# Patient Record
Sex: FEMALE | Race: WHITE | NOT HISPANIC OR LATINO | URBAN - METROPOLITAN AREA
[De-identification: names, ages, dates, MRNs, and addresses within clinical notes are randomized per-mention and may not be internally consistent; named-entity substitution may affect disease eponyms.]

---

## 2023-09-28 ENCOUNTER — TRANSFERRED RECORDS (OUTPATIENT)
Dept: HEALTH INFORMATION MANAGEMENT | Facility: CLINIC | Age: 33
End: 2023-09-28

## 2023-10-16 ENCOUNTER — TELEPHONE (OUTPATIENT)
Dept: SURGERY | Facility: CLINIC | Age: 33
End: 2023-10-16

## 2023-10-16 NOTE — TELEPHONE ENCOUNTER
Patient had colon cancer this spring and is s/p sigmoidectomy with colostomy placement with Dr. Rachell Bartholomew. Per patient she did have residual cancer but elected to not do chemotherapy. She follows with Dr. Bowers at [facility]. Last scan was Sept 2023. Because she decided to not do chemotherapy Dr Bartholomew will not take down her colostomy. She would like a second opinion on this. Scheduled visit with Dr. Hernandez.     Insurance Carrier: [employer]

## 2023-10-16 NOTE — TELEPHONE ENCOUNTER
Diagnosis, Referred by & from: Discuss Colostomy Reversal   Appt date: 2023   NOTES STATUS DETAILS   OFFICE NOTE from referring provider N/A    OFFICE NOTE from other specialist Care Everywhere [facility]:  23, 23 - ONC OV with Dr. Bowers    [facility]:  23 - WOUND OV with Shanti Valiente RN  23 - UC OV with Sangeeta Silver NP   DISCHARGE SUMMARY from hospital Care Everywhere [facility]:  23 - Admission with Dr. Cedillo  23 - Admission with Dr. Roth  23 - Admission with Dr. Bejarano   DISCHARGE REPORT from the ER N/A    OPERATIVE REPORT Care Everywhere [facility]:  23 - OP Note for LAPAROSCOPIC SIGMOID RESECTION WITH CREATION OF COLOSTOMY, APPENDECTOMY, CYSTOSCOPY WITH BILATERAL URETERAL STENT with Dr. Cedillo   MEDICATION LIST Care Everywhere    LABS     ANAL PAP/CEA Care Everywhere [facility]:  23 - CEA (1.9)    [facility]:  23 - CEA (19.5)   BIOPSIES/PATHOLOGY RELATED TO DIAGNOSIS Reqd 23 per RN  - Received -  [facility]:  23 - Colon Biopsy (Case: [id number])  23 - Sigmoid Colon Biopsy (Case: [id number])   DIAGNOSTIC PROCEDURES     FLEX SIGMOIDOSCOPY Care Everywhere [facility]:  23 - Flexible Sigmoidoscopy   IMAGING (DISC & REPORT)      CT Received [facility]:  10/26/23 - CT Chest/Abd/Pelvis    [facility]:  23 - CT Chest  23 - CT Abd/Pelvis  23 - CT Abd/Pelvis   XRAY Received [facility]:  23 - XR Abdomen  23 - XR Abdomen     Records Requested  10/16/23    Facility  [facility]  Fax: [phone]  Path Fax: [phone]   Outcome * 10/16/23 2:58 PM Faxed urg req to [facility] for images to be pushed to [facility] PACs. - Polly    * 10/16/23 3:22 PM Faxed req to [facility] for Pathology slides to be Fed'Exd to the clinic. - Polly    * 10/18/23 1:18 PM Pathology received from [facility] and sent to be reviewed with filled out pathology form.  Images received from [facility] and attached to the patient in PACs. - Polly    Trackin

## 2023-10-16 NOTE — TELEPHONE ENCOUNTER
[facility] Call Center    Phone Message    May a detailed message be left on voicemail: yes     Reason for Call: Other: Pt stated they are having stoma treatment issues with their current surgeon and is requesting a call back from the clinic to discuss scheduling an appt.       Action Taken: Message routed to:  [facility] DOMINGO MORIN    Travel Screening: Not Applicable

## 2023-10-17 ENCOUNTER — TRANSCRIBE ORDERS (OUTPATIENT)
Dept: OTHER | Age: 33
End: 2023-10-17

## 2023-10-17 DIAGNOSIS — C18.9 COLON CANCER (H): Primary | ICD-10-CM

## 2023-10-18 DIAGNOSIS — C18.7 CANCER OF SIGMOID COLON (H): Primary | ICD-10-CM

## 2023-10-19 ENCOUNTER — LAB REQUISITION (OUTPATIENT)
Dept: LAB | Facility: CLINIC | Age: 33
End: 2023-10-19
Payer: COMMERCIAL

## 2023-10-19 PROCEDURE — 88321 CONSLTJ&REPRT SLD PREP ELSWR: CPT | Mod: GC | Performed by: PATHOLOGY

## 2023-10-20 ENCOUNTER — MEDICAL CORRESPONDENCE (OUTPATIENT)
Dept: HEALTH INFORMATION MANAGEMENT | Facility: CLINIC | Age: 33
End: 2023-10-20
Payer: COMMERCIAL

## 2023-10-23 LAB
PATH REPORT.COMMENTS IMP SPEC: ABNORMAL
PATH REPORT.COMMENTS IMP SPEC: YES
PATH REPORT.FINAL DX SPEC: ABNORMAL
PATH REPORT.MICROSCOPIC SPEC OTHER STN: ABNORMAL
PATH REPORT.RELEVANT HX SPEC: ABNORMAL
PATH REPORT.RELEVANT HX SPEC: ABNORMAL
PATH REPORT.SITE OF ORIGIN SPEC: ABNORMAL

## 2023-10-26 ENCOUNTER — TRANSFERRED RECORDS (OUTPATIENT)
Dept: HEALTH INFORMATION MANAGEMENT | Facility: CLINIC | Age: 33
End: 2023-10-26
Payer: COMMERCIAL

## 2023-11-01 NOTE — PROGRESS NOTES
Colon and Rectal Surgery Clinic Note    RE: Rachell Stacy.  : 1990.  MYNOR: 2023.    Reason for visit: colostomy reversal, second opinion.    HPI: Rachell Stacy is a 33 year old female who presents today for colostomy reversal. On 2023 she was admitted to the hospital for abdominal pain and was found to have a perforated sigmoid cancer involving the appendix. She underwent a laparoscopic sigmoid colectomy with end colostomy and appendectomy with Dr. Bina Cedillo. Pathology demonstrated moderately differentiated sigmoid adenocarcinoma, MMR intact, [id number] (3/28 lymph nodes positive). On pathology consult there was positive LVI, PNI, TB and one of the positive lymph nodes was from the mesentery. She follows with Dr. Bryan Bowers at [facility] who recommended adjuvant chemotherapy however pt politely declined at that time as she wanted to seek holistic measures. CT Chest/Abdomen/Pelvis on 10/26/2023 demonstrated a soft tissue nodular density in the left paracolic gutter concerning for metastatic lesion, a subcentimeter low-attenuation lesion in right hepatic lobe which is new from previous scan, and a 2mm nodule in the left lung, unchanged. CEA on 2023 was 1.9.     Today Rachell feels fine. She has been losing healthy weight.     Pathology consult from surgery on 2023:  SIGMOID COLON AND APPENDIX, SIGMOIDECTOMY AND EN BLOC APPENDECTOMY:  Adenocarcinoma, sigmoid colon:   -Moderately differentiated; size= 10.4 x 9.1 x 8.0 cm   -Tumor invades through the visceral peritoneum with gross perforation    -Lymphovascular and perineural invasion are identified   -Positive for metastasis to three of twenty-eight local lymph nodes (3 /28)   -Margins: Negative for involvement by carcinoma:     (closest margin: mesenteric margin, 1.0 cm clear)     (one of positive lymph nodes present at the mesenteric margin)     (distance from proximal & distal margins: 11 cm & 16 cm respectively)   -Positive for tumor  deposit x1 focus   -Negative for high score tumor budding (score = 0-4)   -AJCC/TNM stage: pT4a N1   -MMR intact by immunohistochemistry by report in a pre-op biopsy:     ([facility] endoscopic biopsy ref. #[id number] dated 4/27/2023)  Appendix: Benign appendix with fibrous serosal adhesions    CEA (9/25/2023):    CT Chest/Abdomen/Pelvis (10/26/2023):    Medical history:  No past medical history on file.    Surgical history:  No past surgical history on file.    Family history:  No family history on file.      Medications:  No current outpatient medications on file.       Allergies:  Not on File    Social history:   Social History     Tobacco Use    Smoking status: Not on file    Smokeless tobacco: Not on file   Substance Use Topics    Alcohol use: Not on file     Marital status: single.    ROS:  A complete review of systems was performed with the patient and all systems negative except as per HPI.    Physical Examination:  Exam was chaperoned by ANSHU Billy-P  There were no vitals taken for this visit.  General: Well hydrated. No acute distress.  CV: RRR  Lung: Non-labored breathing on RA  Abdomen: Soft, NT, ostomy in place, mild hepatomegaly. No inguinal adenopathy palpated.      ASSESSMENT    This is a 33 year old F with a h/o mod diff [id number] adenocarcinoma LVI/PNI+ s/p Christine's procedure by Dr Cedillo who then declined adjuvant chemo, now with concerns for metastatic disease to both peritoneum and liver. Further work up is indicated with at least a liver MRI. She would then need to meet with medical oncology to discuss next steps. Risks, benefits, and alternatives of operative treatment were thoroughly discussed with the patient, she understands these well and agrees to proceed.    All pertinent labs and imaging were personally reviewed by me.      PLAN  - Liver MRI  - DMT discussion  - Referral to med onc  - RTC after completing adjuvant chemotherapy with re-staging to discuss next steps      45 minutes  spent on the date of the encounter doing chart review, history and exam, imaging review, documentation and further activities as noted above.      Kendrick Hernandez MD    Division of Colon and Rectal Surgery  [facility]    Referring Provider:  Referred Self, MD  No address on file     Primary Care Provider:  Shanna Hassan

## 2023-11-07 ENCOUNTER — PRE VISIT (OUTPATIENT)
Dept: SURGERY | Facility: CLINIC | Age: 33
End: 2023-11-07

## 2023-11-07 ENCOUNTER — OFFICE VISIT (OUTPATIENT)
Dept: SURGERY | Facility: CLINIC | Age: 33
End: 2023-11-07
Payer: COMMERCIAL

## 2023-11-07 VITALS — OXYGEN SATURATION: 98 % | SYSTOLIC BLOOD PRESSURE: 127 MMHG | DIASTOLIC BLOOD PRESSURE: 78 MMHG | HEART RATE: 94 BPM

## 2023-11-07 DIAGNOSIS — Z93.3 STATUS POST COLOSTOMY (H): Primary | ICD-10-CM

## 2023-11-07 DIAGNOSIS — C18.7 MALIGNANT NEOPLASM OF SIGMOID COLON (H): ICD-10-CM

## 2023-11-07 PROCEDURE — 99204 OFFICE O/P NEW MOD 45 MIN: CPT | Performed by: SURGERY

## 2023-11-07 ASSESSMENT — PAIN SCALES - GENERAL: PAINLEVEL: NO PAIN (0)

## 2023-11-07 NOTE — NURSING NOTE
Chief Complaint   Patient presents with    Consult       Vitals:    11/07/23 1444   BP: 127/78   BP Location: Left arm   Patient Position: Sitting   Cuff Size: Adult Large   Pulse: 94   SpO2: 98%       There is no height or weight on file to calculate BMI.    Fabricio Capone EMT-P

## 2023-11-07 NOTE — LETTER
2023       RE: Rachell Stacy  [address] [facility] [address]     Dear Colleague,    Thank you for referring your patient, Rachell Stacy, to the [facility] [place] [facility]. Please see a copy of my visit note below.    Colon and Rectal Surgery Clinic Note    RE: Rachell Stacy.  : 1990.  MYNOR: 2023.    Reason for visit: colostomy reversal, second opinion.    HPI: Rachell Stacy is a 33 year old female who presents today for colostomy reversal. On 2023 she was admitted to the hospital for abdominal pain and was found to have a perforated sigmoid cancer involving the appendix. She underwent a laparoscopic sigmoid colectomy with end colostomy and appendectomy with Dr. Bina Cedillo. Pathology demonstrated moderately differentiated sigmoid adenocarcinoma, MMR intact, [id number] (3/28 lymph nodes positive). On pathology consult there was positive LVI, PNI, TB and one of the positive lymph nodes was from the mesentery. She follows with Dr. Bryan Bowers at [facility] who recommended adjuvant chemotherapy however pt politely declined at that time as she wanted to seek holistic measures. CT Chest/Abdomen/Pelvis on 10/26/2023 demonstrated a soft tissue nodular density in the left paracolic gutter concerning for metastatic lesion, a subcentimeter low-attenuation lesion in right hepatic lobe which is new from previous scan, and a 2mm nodule in the left lung, unchanged. CEA on 2023 was 1.9.     Today Rachell feels fine. She has been losing healthy weight.     Pathology consult from surgery on 2023:  SIGMOID COLON AND APPENDIX, SIGMOIDECTOMY AND EN BLOC APPENDECTOMY:  Adenocarcinoma, sigmoid colon:   -Moderately differentiated; size= 10.4 x 9.1 x 8.0 cm   -Tumor invades through the visceral peritoneum with gross perforation    -Lymphovascular and perineural invasion are identified   -Positive for metastasis  to three of twenty-eight local lymph nodes (3 /28)   -Margins: Negative for involvement by carcinoma:     (closest margin: mesenteric margin, 1.0 cm clear)     (one of positive lymph nodes present at the mesenteric margin)     (distance from proximal & distal margins: 11 cm & 16 cm respectively)   -Positive for tumor deposit x1 focus   -Negative for high score tumor budding (score = 0-4)   -AJCC/TNM stage: pT4a N1   -MMR intact by immunohistochemistry by report in a pre-op biopsy:     ([facility] endoscopic biopsy ref. #[id number] dated 4/27/2023)  Appendix: Benign appendix with fibrous serosal adhesions    CEA (9/25/2023):    CT Chest/Abdomen/Pelvis (10/26/2023):    Medical history:  No past medical history on file.    Surgical history:  No past surgical history on file.    Family history:  No family history on file.      Medications:  No current outpatient medications on file.       Allergies:  Not on File    Social history:   Social History     Tobacco Use    Smoking status: Not on file    Smokeless tobacco: Not on file   Substance Use Topics    Alcohol use: Not on file     Marital status: single.    ROS:  A complete review of systems was performed with the patient and all systems negative except as per HPI.    Physical Examination:  Exam was chaperoned by ANSHU Billy-P  There were no vitals taken for this visit.  General: Well hydrated. No acute distress.  CV: RRR  Lung: Non-labored breathing on RA  Abdomen: Soft, NT, ostomy in place, mild hepatomegaly. No inguinal adenopathy palpated.      ASSESSMENT    This is a 33 year old F with a h/o mod diff [id number] adenocarcinoma LVI/PNI+ s/p Christine's procedure by Dr Cedillo who then declined adjuvant chemo, now with concerns for metastatic disease to both peritoneum and liver. Further work up is indicated with at least a liver MRI. She would then need to meet with medical oncology to discuss next steps. Risks, benefits, and alternatives of operative treatment were  thoroughly discussed with the patient, she understands these well and agrees to proceed.    All pertinent labs and imaging were personally reviewed by me.      PLAN  - Liver MRI  - DMT discussion  - Referral to med onc  - RTC after completing adjuvant chemotherapy with re-staging to discuss next steps    45 minutes spent on the date of the encounter doing chart review, history and exam, imaging review, documentation and further activities as noted above.    Referring Provider:  Referred Self, MD  No address on file     Primary Care Provider:  Shanna Hassan      Again, thank you for allowing me to participate in the care of your patient.      Sincerely,    Kendrick Hernandez MD

## 2024-01-12 ENCOUNTER — TRANSFERRED RECORDS (OUTPATIENT)
Dept: HEALTH INFORMATION MANAGEMENT | Facility: CLINIC | Age: 34
End: 2024-01-12

## 2024-01-13 ENCOUNTER — HEALTH MAINTENANCE LETTER (OUTPATIENT)
Age: 34
End: 2024-01-13

## 2024-02-14 ENCOUNTER — TRANSFERRED RECORDS (OUTPATIENT)
Dept: HEALTH INFORMATION MANAGEMENT | Facility: CLINIC | Age: 34
End: 2024-02-14

## 2024-03-12 ENCOUNTER — TRANSFERRED RECORDS (OUTPATIENT)
Dept: HEALTH INFORMATION MANAGEMENT | Facility: CLINIC | Age: 34
End: 2024-03-12
Payer: COMMERCIAL

## 2024-03-13 ENCOUNTER — TRANSFERRED RECORDS (OUTPATIENT)
Dept: HEALTH INFORMATION MANAGEMENT | Facility: CLINIC | Age: 34
End: 2024-03-13
Payer: COMMERCIAL

## 2024-03-15 ENCOUNTER — TELEPHONE (OUTPATIENT)
Dept: SURGERY | Facility: CLINIC | Age: 34
End: 2024-03-15
Payer: COMMERCIAL

## 2024-03-15 NOTE — TELEPHONE ENCOUNTER
[facility] Call Center    Phone Message    May a detailed message be left on voicemail: yes     Reason for Call: Other: Pretty calling for Dr Hernandez to call Dr Bowers back to discuss patient. Please reach out to Dr Bowers at cell number [phone].     Action Taken: Message routed to:  [facility] Colon and Rectal    Travel Screening: Not Applicable

## 2024-03-19 NOTE — PROGRESS NOTES
Colon and Rectal Surgery Clinic Note    RE: Rachell Stacy.  : 1990.  MYNOR: 3/26/2024.    Reason for visit: discuss surgery.    HPI: Rachell Stacy is a 33 year old female who presents today to discuss surgery. On 2023 she was admitted to the hospital for abdominal pain and was found to have a perforated sigmoid cancer involving the appendix. She underwent a laparoscopic sigmoid colectomy with end colostomy and appendectomy with Dr. Bina Cedillo. Pathology demonstrated moderately differentiated sigmoid adenocarcinoma, MMR intact, [id number] (3/28 lymph nodes positive). On pathology consult there was positive LVI, PNI, TB and one of the positive lymph nodes was from the mesentery. She follows with Dr. Bryan Bowers at [facility] who recommended adjuvant chemotherapy however pt politely declined at that time as she wanted to seek holistic measures. CT Chest/Abdomen/Pelvis on 10/26/2023 demonstrated a soft tissue nodular density in the left paracolic gutter concerning for metastatic lesion, a subcentimeter low-attenuation lesion in right hepatic lobe which is new from previous scan, and a 2mm nodule in the left lung, unchanged. CEA on 2023 was 1.9.     She underwent 8 cycles of FOLFOX from 2023-2024. PET on 3/12/2024 demonstrated a decreased size of the left paracolic gutter mass. There was no other evidence of metastatic disease.  The liver lesion seen on previous scan was not visible on PET. There was an unchanged 2mm left lung nodule. CEA on 2024 was 2.1.      Interval History: Doing well, tolerated chemo, eager to have her ostomy taken down.     Pathology consult from surgery on 2023:  SIGMOID COLON AND APPENDIX, SIGMOIDECTOMY AND EN BLOC APPENDECTOMY:  Adenocarcinoma, sigmoid colon:   -Moderately differentiated; size= 10.4 x 9.1 x 8.0 cm   -Tumor invades through the visceral peritoneum with gross perforation    -Lymphovascular and perineural invasion are identified   -Positive  for metastasis to three of twenty-eight local lymph nodes (3 /28)   -Margins: Negative for involvement by carcinoma:     (closest margin: mesenteric margin, 1.0 cm clear)     (one of positive lymph nodes present at the mesenteric margin)     (distance from proximal & distal margins: 11 cm & 16 cm respectively)   -Positive for tumor deposit x1 focus   -Negative for high score tumor budding (score = 0-4)   -AJCC/TNM stage: pT4a N1   -MMR intact by immunohistochemistry by report in a pre-op biopsy:     ([facility] endoscopic biopsy ref. #[id number] dated 4/27/2023)  Appendix: Benign appendix with fibrous serosal adhesions     CEA (9/25/2023):    CT Chest/Abdomen/Pelvis (10/26/2023):      CT Chest/Abdomen/Pelvis (1/12/2024):      PET (3/12/2024):        Medications:  Current Outpatient Medications   Medication Sig Dispense Refill    magnesium citrate 1.745 GM/30ML solution Drink 1 bottle at 8pm the night before surgery 296 mL 0    metroNIDAZOLE (FLAGYL) 500 MG tablet Take 1 tablet (500 mg) by mouth every 6 hours At 8:00 am, 2:00 pm, 8:00 pm the day prior to your surgery with neomycin and zofran. 3 tablet 0    neomycin (MYCIFRADIN) 500 MG tablet Take 2 tablets (1,000 mg) by mouth every 6 hours At 8:00 am, 2:00 pm, 8:00 pm the day prior to your surgery with flagyl and zofran. 6 tablet 0    ondansetron (ZOFRAN) 4 MG tablet Take 1 tablet (4 mg) by mouth every 6 hours At 8:00 am, 2:00 pm, 8:00 pm the day prior to your surgery with neomycin and flagyl. 3 tablet 0    polyethylene glycol (MIRALAX) 17 GM/Dose powder Please take 238 grams mixed with 64 oz of Gatorade at 4pm the night before surgery 238 g 0       Allergies:  No Known Allergies    Social history:   Social History     Tobacco Use    Smoking status: Never    Smokeless tobacco: Never   Substance Use Topics    Alcohol use: Not on file     Marital status: single.    ROS:  A complete review of systems was performed with the patient and all systems negative except as per  "HPI.    Physical Examination:  Exam was chaperoned by Fabricio Capone, EMT-P  /73 (BP Location: Left arm, Patient Position: Sitting, Cuff Size: Adult Large)   Pulse 98   SpO2 97%   General: Well hydrated. No acute distress.  Abdomen: Soft, NT, well healed midline incision. Ostomy in place.  Perianal external examination:  Perianal skin: intact.  Lesions: No.  Eversion of buttocks: There was not evidence of an anal fissure. Details: N/A.    Digital rectal examination: Was performed.   Sphincter tone: Good.  Palpable lesions: No.  Other: None.  Bimanual examination: was not performed.      Procedures:  Flexible sigmoidoscopy: after obtaining informed consent and performing a \"time out\", an adult flexible sigmoidoscope was introduced through the anus and passed up to the mid sigmoid colon. The quality of the prep was excellent. Findings: There was no active ulceration, inflammation or bleeding. No additional abnormalities were seen. Staple line at 15 cm. Total scope time: 10 minutes. The patient tolerated the procedure well.      ASSESSMENT    This is a 33 year old F with a moderately differentiated sigmoid adenocarcinoma, MMR intact, [id number] s/p Christine's who subsequently developed peritoneal metastases and is now s/p adjuvant treatment (which was initially declined). Her disease appears resectable and therefore a cytoreduction with colostomy takedown is indicated. The plan was also discussed over the phone with dr Bowers. All questions were answered. Risks, benefits, and alternatives of operative treatment were thoroughly discussed with the patient, she understands these well and agrees to proceed.    All pertinent labs and imaging were personally reviewed by me.    PLAN  - Urology for cysto/stents  - Mech/abx bowel prep  - To OR for CRS and ostomy takedown  - Preop eval and teaching  - WOCN to eyad for possible ileostomy      30 minutes spent on the date of the encounter doing chart review, history and exam, " imaging review, documentation and further activities as noted above.      Kendrick Hernandez MD    Division of Colon and Rectal Surgery  [facility]    Referring Provider:  Provider Not In System        Primary Care Provider:  Shanna Hassan

## 2024-03-26 ENCOUNTER — PREP FOR PROCEDURE (OUTPATIENT)
Dept: UROLOGY | Facility: CLINIC | Age: 34
End: 2024-03-26

## 2024-03-26 ENCOUNTER — PREP FOR PROCEDURE (OUTPATIENT)
Dept: SURGERY | Facility: CLINIC | Age: 34
End: 2024-03-26

## 2024-03-26 ENCOUNTER — TELEPHONE (OUTPATIENT)
Dept: SURGERY | Facility: CLINIC | Age: 34
End: 2024-03-26

## 2024-03-26 ENCOUNTER — OFFICE VISIT (OUTPATIENT)
Dept: SURGERY | Facility: CLINIC | Age: 34
End: 2024-03-26
Payer: COMMERCIAL

## 2024-03-26 VITALS — SYSTOLIC BLOOD PRESSURE: 123 MMHG | DIASTOLIC BLOOD PRESSURE: 73 MMHG | OXYGEN SATURATION: 97 % | HEART RATE: 98 BPM

## 2024-03-26 DIAGNOSIS — C18.7 MALIGNANT NEOPLASM OF SIGMOID COLON (H): ICD-10-CM

## 2024-03-26 DIAGNOSIS — Z93.3 STATUS POST COLOSTOMY (H): ICD-10-CM

## 2024-03-26 DIAGNOSIS — C78.6 PERITONEAL CARCINOMATOSIS (H): Primary | ICD-10-CM

## 2024-03-26 PROCEDURE — 99214 OFFICE O/P EST MOD 30 MIN: CPT | Performed by: SURGERY

## 2024-03-26 RX ORDER — NEOMYCIN SULFATE 500 MG/1
1000 TABLET ORAL EVERY 6 HOURS
Qty: 6 TABLET | Refills: 0 | Status: ON HOLD | OUTPATIENT
Start: 2024-03-26 | End: 2024-04-04

## 2024-03-26 RX ORDER — POLYETHYLENE GLYCOL 3350 17 G/17G
POWDER, FOR SOLUTION ORAL
Qty: 238 G | Refills: 0 | Status: ON HOLD | OUTPATIENT
Start: 2024-03-26 | End: 2024-04-04

## 2024-03-26 RX ORDER — METRONIDAZOLE 500 MG/1
500 TABLET ORAL EVERY 6 HOURS
Qty: 3 TABLET | Refills: 0 | Status: ON HOLD | OUTPATIENT
Start: 2024-03-26 | End: 2024-04-04

## 2024-03-26 RX ORDER — MAGNESIUM CARB/ALUMINUM HYDROX 105-160MG
TABLET,CHEWABLE ORAL
Qty: 296 ML | Refills: 0 | Status: ON HOLD | OUTPATIENT
Start: 2024-03-26 | End: 2024-04-04

## 2024-03-26 RX ORDER — ONDANSETRON 4 MG/1
4 TABLET, FILM COATED ORAL EVERY 6 HOURS
Qty: 3 TABLET | Refills: 0 | Status: ON HOLD | OUTPATIENT
Start: 2024-03-26 | End: 2024-04-04

## 2024-03-26 ASSESSMENT — PAIN SCALES - GENERAL: PAINLEVEL: NO PAIN (0)

## 2024-03-26 NOTE — CONFIDENTIAL NOTE
Rachell Regis had flexible endoscope (serial number [id number], model Olympus [id number]) used for a procedure on 3/26/2024 at 1:32 PM.      Fabricio Capone, EMT-P

## 2024-03-26 NOTE — LETTER
3/26/2024       RE: Rachell Stacy  [address] [facility] [address]       Dear Colleague,    Thank you for referring your patient, Rachell Stacy, to the [facility] COLON AND RECTAL SURGERY CLINIC [place] at [facility]. Please see a copy of my visit note below.    Colon and Rectal Surgery Clinic Note    RE: Rachell Stacy.  : 1990.  MYNOR: 3/26/2024.    Reason for visit: discuss surgery.    HPI: Rachell Stacy is a 33 year old female who presents today to discuss surgery. On 2023 she was admitted to the hospital for abdominal pain and was found to have a perforated sigmoid cancer involving the appendix. She underwent a laparoscopic sigmoid colectomy with end colostomy and appendectomy with Dr. Bina Cedillo. Pathology demonstrated moderately differentiated sigmoid adenocarcinoma, MMR intact, [id number] (3/28 lymph nodes positive). On pathology consult there was positive LVI, PNI, TB and one of the positive lymph nodes was from the mesentery. She follows with Dr. Bryan Bowers at [facility] who recommended adjuvant chemotherapy however pt politely declined at that time as she wanted to seek holistic measures. CT Chest/Abdomen/Pelvis on 10/26/2023 demonstrated a soft tissue nodular density in the left paracolic gutter concerning for metastatic lesion, a subcentimeter low-attenuation lesion in right hepatic lobe which is new from previous scan, and a 2mm nodule in the left lung, unchanged. CEA on 2023 was 1.9.     She underwent 8 cycles of FOLFOX from 2023-2024. PET on 3/12/2024 demonstrated a decreased size of the left paracolic gutter mass. There was no other evidence of metastatic disease.  The liver lesion seen on previous scan was not visible on PET. There was an unchanged 2mm left lung nodule. CEA on 2024 was 2.1.      Interval History: Doing well, tolerated chemo, eager to have her ostomy taken down.     Pathology consult from  surgery on 5/4/2023:  SIGMOID COLON AND APPENDIX, SIGMOIDECTOMY AND EN BLOC APPENDECTOMY:  Adenocarcinoma, sigmoid colon:   -Moderately differentiated; size= 10.4 x 9.1 x 8.0 cm   -Tumor invades through the visceral peritoneum with gross perforation    -Lymphovascular and perineural invasion are identified   -Positive for metastasis to three of twenty-eight local lymph nodes (3 /28)   -Margins: Negative for involvement by carcinoma:     (closest margin: mesenteric margin, 1.0 cm clear)     (one of positive lymph nodes present at the mesenteric margin)     (distance from proximal & distal margins: 11 cm & 16 cm respectively)   -Positive for tumor deposit x1 focus   -Negative for high score tumor budding (score = 0-4)   -AJCC/TNM stage: pT4a N1   -MMR intact by immunohistochemistry by report in a pre-op biopsy:     ([facility] endoscopic biopsy ref. #[id number] dated 4/27/2023)  Appendix: Benign appendix with fibrous serosal adhesions     CEA (9/25/2023):    CT Chest/Abdomen/Pelvis (10/26/2023):      CT Chest/Abdomen/Pelvis (1/12/2024):      PET (3/12/2024):        Medications:  Current Outpatient Medications   Medication Sig Dispense Refill    magnesium citrate 1.745 GM/30ML solution Drink 1 bottle at 8pm the night before surgery 296 mL 0    metroNIDAZOLE (FLAGYL) 500 MG tablet Take 1 tablet (500 mg) by mouth every 6 hours At 8:00 am, 2:00 pm, 8:00 pm the day prior to your surgery with neomycin and zofran. 3 tablet 0    neomycin (MYCIFRADIN) 500 MG tablet Take 2 tablets (1,000 mg) by mouth every 6 hours At 8:00 am, 2:00 pm, 8:00 pm the day prior to your surgery with flagyl and zofran. 6 tablet 0    ondansetron (ZOFRAN) 4 MG tablet Take 1 tablet (4 mg) by mouth every 6 hours At 8:00 am, 2:00 pm, 8:00 pm the day prior to your surgery with neomycin and flagyl. 3 tablet 0    polyethylene glycol (MIRALAX) 17 GM/Dose powder Please take 238 grams mixed with 64 oz of Gatorade at 4pm the night before surgery 238 g 0  "      Allergies:  No Known Allergies    Social history:   Social History     Tobacco Use    Smoking status: Never    Smokeless tobacco: Never   Substance Use Topics    Alcohol use: Not on file     Marital status: single.    ROS:  A complete review of systems was performed with the patient and all systems negative except as per HPI.    Physical Examination:  Exam was chaperoned by Fabricio Capone, EMT-P  /73 (BP Location: Left arm, Patient Position: Sitting, Cuff Size: Adult Large)   Pulse 98   SpO2 97%   General: Well hydrated. No acute distress.  Abdomen: Soft, NT, well healed midline incision. Ostomy in place.  Perianal external examination:  Perianal skin: intact.  Lesions: No.  Eversion of buttocks: There was not evidence of an anal fissure. Details: N/A.    Digital rectal examination: Was performed.   Sphincter tone: Good.  Palpable lesions: No.  Other: None.  Bimanual examination: was not performed.      Procedures:  Flexible sigmoidoscopy: after obtaining informed consent and performing a \"time out\", an adult flexible sigmoidoscope was introduced through the anus and passed up to the mid sigmoid colon. The quality of the prep was excellent. Findings: There was no active ulceration, inflammation or bleeding. No additional abnormalities were seen. Staple line at 15 cm. Total scope time: 10 minutes. The patient tolerated the procedure well.      ASSESSMENT    This is a 33 year old F with a moderately differentiated sigmoid adenocarcinoma, MMR intact, [id number] s/p Christine's who subsequently developed peritoneal metastases and is now s/p adjuvant treatment (which was initially declined). Her disease appears resectable and therefore a cytoreduction with colostomy takedown is indicated. The plan was also discussed over the phone with dr Bowers. All questions were answered. Risks, benefits, and alternatives of operative treatment were thoroughly discussed with the patient, she understands these well and agrees " to proceed.    All pertinent labs and imaging were personally reviewed by me.    PLAN  - Urology for cysto/stents  - Mech/abx bowel prep  - To OR for CRS and ostomy takedown  - Preop eval and teaching  - WOCN to eyad for possible ileostomy      30 minutes spent on the date of the encounter doing chart review, history and exam, imaging review, documentation and further activities as noted above.        Referring Provider:  Provider Not In System        Primary Care Provider:  Shanna Hassan, thank you for allowing me to participate in the care of your patient.      Sincerely,    Kendrick Hernandez MD

## 2024-03-26 NOTE — PATIENT INSTRUCTIONS
Follow up:    We will want to know about your decision ASAP as we would need to get you in for your pre ops this week. Otherwise we are looking into May  You can call Rachell, our surgery scheduler, directly to start the scheduling process.    Appointment you will need in prep: pre op physical with our anesthesia team, blood work, and ostomy nurse visit   You will need to do a full bowel prep with antibiotics the day before surgery. I have sent these to your pharmacy. You will receive a surgery packet through [employer] and mail.     MARILYN Post [phone]    Clinic Fax Number [phone]    Surgery Scheduling (Rachell) [phone]    My Chart is available 24 hours a day and is a secure way to access your records and communicate with your care team.  I strongly recommend signing up if you haven't already done so, if you are comfortable with computers.  If you would like to inquire about this or are having problems with My Chart access, you may call [phone] or go online at [email or web address].  Please allow at least 24 hours for a response and extra time on weekends and Holidays.

## 2024-03-26 NOTE — TELEPHONE ENCOUNTER
FUTURE VISIT INFORMATION      SURGERY INFORMATION:  Date: 4/1/24  [facility] or  Surgeon:  Kendrick Hernandez MD   Anesthesia Type:  general  Procedure: Exploratory LAPAROSCOPY, cytoreduction, colostomy takedown, possible ileostomy placement CLOSURE, COLOSTOMY, LAPAROSCOPIC CREATION, ILEOSTOMY, LAPAROSCOPIC   Consult: ov 3/26/24    RECORDS REQUESTED FROM:       Primary Care Provider: Joya

## 2024-03-26 NOTE — TELEPHONE ENCOUNTER
Patient is scheduled for surgery with Dr. Kendrick Hernandez     Spoke with: Rachell    Date of Surgery: Monday April 1, 2024    Location: [facility] OR    Informed patient they will need an adult  (surgery admit)    Pre op with Provider Dr. Kendrick Hernandez     H&P Scheduled with PAC Video Visit on 3/28/2024    Labs scheduled at [facility] on 3/28/2024    Ostomy Marking to be completed by Inpatient [facility]    2x Post-op appts with CRS scheduled    Surgery packet: will send via [employer] and Mail     Rachell Hough  Meli-op Coordinator  [facility]  Direct Phone: [phone]

## 2024-03-26 NOTE — NURSING NOTE
Chief Complaint   Patient presents with    Follow Up       Vitals:    03/26/24 1147   BP: 123/73   BP Location: Left arm   Patient Position: Sitting   Cuff Size: Adult Large   Pulse: 98   SpO2: 97%       There is no height or weight on file to calculate BMI.    Fabricio Capone EMT-P

## 2024-03-27 LAB
ABO/RH(D): NORMAL
ANTIBODY SCREEN: NEGATIVE
SPECIMEN EXPIRATION DATE: NORMAL

## 2024-03-28 ENCOUNTER — ANESTHESIA EVENT (OUTPATIENT)
Dept: SURGERY | Facility: CLINIC | Age: 34
End: 2024-03-28
Payer: COMMERCIAL

## 2024-03-28 ENCOUNTER — PRE VISIT (OUTPATIENT)
Dept: SURGERY | Facility: CLINIC | Age: 34
End: 2024-03-28

## 2024-03-28 ENCOUNTER — VIRTUAL VISIT (OUTPATIENT)
Dept: SURGERY | Facility: CLINIC | Age: 34
End: 2024-03-28
Payer: COMMERCIAL

## 2024-03-28 ENCOUNTER — LAB (OUTPATIENT)
Dept: LAB | Facility: CLINIC | Age: 34
End: 2024-03-28
Payer: COMMERCIAL

## 2024-03-28 VITALS — HEIGHT: 69 IN | WEIGHT: 259 LBS | BODY MASS INDEX: 38.36 KG/M2

## 2024-03-28 DIAGNOSIS — Z01.818 PREOP EXAMINATION: ICD-10-CM

## 2024-03-28 DIAGNOSIS — C18.7 MALIGNANT NEOPLASM OF SIGMOID COLON (H): ICD-10-CM

## 2024-03-28 DIAGNOSIS — Z01.818 PREOP EXAMINATION: Primary | ICD-10-CM

## 2024-03-28 DIAGNOSIS — C78.6 PERITONEAL CARCINOMATOSIS (H): ICD-10-CM

## 2024-03-28 LAB
BASOPHILS # BLD AUTO: 0 10E3/UL (ref 0–0.2)
BASOPHILS NFR BLD AUTO: 0 %
EOSINOPHIL # BLD AUTO: 0.1 10E3/UL (ref 0–0.7)
EOSINOPHIL NFR BLD AUTO: 1 %
ERYTHROCYTE [DISTWIDTH] IN BLOOD BY AUTOMATED COUNT: 16.7 % (ref 10–15)
HCT VFR BLD AUTO: 34.3 % (ref 35–47)
HGB BLD-MCNC: 10.9 G/DL (ref 11.7–15.7)
IMM GRANULOCYTES # BLD: 0.1 10E3/UL
IMM GRANULOCYTES NFR BLD: 1 %
LYMPHOCYTES # BLD AUTO: 2.9 10E3/UL (ref 0.8–5.3)
LYMPHOCYTES NFR BLD AUTO: 34 %
MCH RBC QN AUTO: 30.8 PG (ref 26.5–33)
MCHC RBC AUTO-ENTMCNC: 31.8 G/DL (ref 31.5–36.5)
MCV RBC AUTO: 97 FL (ref 78–100)
MONOCYTES # BLD AUTO: 0.5 10E3/UL (ref 0–1.3)
MONOCYTES NFR BLD AUTO: 6 %
NEUTROPHILS # BLD AUTO: 4.8 10E3/UL (ref 1.6–8.3)
NEUTROPHILS NFR BLD AUTO: 58 %
PLATELET # BLD AUTO: 258 10E3/UL (ref 150–450)
RBC # BLD AUTO: 3.54 10E6/UL (ref 3.8–5.2)
WBC # BLD AUTO: 8.4 10E3/UL (ref 4–11)

## 2024-03-28 PROCEDURE — 99203 OFFICE O/P NEW LOW 30 MIN: CPT | Mod: 95 | Performed by: CLINICAL NURSE SPECIALIST

## 2024-03-28 PROCEDURE — 84134 ASSAY OF PREALBUMIN: CPT

## 2024-03-28 PROCEDURE — 80053 COMPREHEN METABOLIC PANEL: CPT

## 2024-03-28 PROCEDURE — 86901 BLOOD TYPING SEROLOGIC RH(D): CPT

## 2024-03-28 PROCEDURE — 86900 BLOOD TYPING SEROLOGIC ABO: CPT

## 2024-03-28 PROCEDURE — 86850 RBC ANTIBODY SCREEN: CPT

## 2024-03-28 PROCEDURE — 85025 COMPLETE CBC W/AUTO DIFF WBC: CPT

## 2024-03-28 PROCEDURE — 36415 COLL VENOUS BLD VENIPUNCTURE: CPT

## 2024-03-28 ASSESSMENT — ENCOUNTER SYMPTOMS
SEIZURES: 0
DYSRHYTHMIAS: 0

## 2024-03-28 ASSESSMENT — LIFESTYLE VARIABLES: TOBACCO_USE: 0

## 2024-03-28 ASSESSMENT — PAIN SCALES - GENERAL: PAINLEVEL: NO PAIN (0)

## 2024-03-28 NOTE — H&P
Pre-Operative H & P     CC:  Preoperative exam to assess for increased cardiopulmonary risk while undergoing surgery and anesthesia.    Date of Encounter: 3/28/2024  Primary Care Physician:  Shanna Hassan     Reason for visit:   Encounter Diagnoses   Name Primary?    Preop examination Yes    Malignant neoplasm of sigmoid colon (H)        HPI  Rachell Stacy is a 33 year old female who presents for pre-operative H & P in preparation for  Procedure Information       Case: [id number] Date/Time: 04/01/24 1000    Procedures:       Cystoscopy, with Ureteral Stent Placement (Bilateral: Urethra)      Exploratory laparotomy, cytoreduction (Abdomen)      colostomy takedown possible ileostomy placement (Abdomen)    Anesthesia type: General with Block    Diagnosis:       Peritoneal carcinomatosis (H) [C78.6]      Malignant neoplasm of sigmoid colon (H) [C18.7]    Pre-op diagnosis:       Peritoneal carcinomatosis (H) [C78.6]      Malignant neoplasm of sigmoid colon (H) [C18.7]    [facility]: [facility]  [facility]    Providers: Rachele Dewitt MD; Kenrdick Hernandez MD          History is obtained from the patient, mother, and chart review    Patient who was admitted for abdominal pain on 5/4/24, and was found to have a perforated sigmoid cancer involving the appendix. She is s/p laparoscopic sigmoid colectomy with end colostomy and appendectomy with Dr. Bina Cedillo. Per notes pathology demonstrated moderately differentiated sigmoid adenocarcinoma, MMR intact, [id number] (3/28 lymph nodes positive). On pathology consult there was positive LVI, PNI, TB and one of the positive lymph nodes was from the mesentery. She follows with Dr. Bryan Bowers at [facility], who recommended adjuvant chemotherapy however patient declined at that time as she wanted to seek holistic measures. A follow up CT Chest/Abdomen/Pelvis on 10/26/2023 demonstrated a soft tissue nodular density in the left paracolic gutter concerning for metastatic lesion, a  subcentimeter low-attenuation lesion in right hepatic lobe which is new from previous scan, and a 2mm nodule in the left lung, unchanged. Her CEA on 9/25/2023 was 1.9.      She then underwent 8 cycles of FOLFOX from 11/20/2023-2/14/2024. PET scan on 3/12/2024 demonstrated a decreased size of the left paracolic gutter mass. There was no other evidence of metastatic disease. The liver lesion seen on previous scan was not visible on PET. There was an unchanged 2mm left lung nodule. CEA on 1/31/2024 was 2.1.     She was referred to Dr. Hernandez in consideration for surgical management. Per notes her disease now appears resectable and she was counseled for above procedures.    Her family history is significant for DVT/PE in father who was found to have Factor 5 Leiden. Patient was tested and was negative.     Hx of abnormal bleeding or anti-platelet use: Denies    Menstrual history: No LMP recorded. (Menstrual status: Chemotherapy).     Past Medical History  Past Medical History:   Diagnosis Date    Colostomy status (H)     Malignant neoplasm of sigmoid colon (H)     Peritoneal carcinomatosis (H)        Past Surgical History  Past Surgical History:   Procedure Laterality Date    Cystoscopy, placement of bilateral ureteral stents  05/04/2023    FLEXIBLE SIGMOIDOSCOPY      Sigmoid resection, colostomy, appendectomy  05/04/2023    [place] teeth extraction          Prior to Admission Medications  Current Outpatient Medications   Medication Sig Dispense Refill    magnesium citrate 1.745 GM/30ML solution Drink 1 bottle at 8pm the night before surgery 296 mL 0    metroNIDAZOLE (FLAGYL) 500 MG tablet Take 1 tablet (500 mg) by mouth every 6 hours At 8:00 am, 2:00 pm, 8:00 pm the day prior to your surgery with neomycin and zofran. 3 tablet 0    neomycin (MYCIFRADIN) 500 MG tablet Take 2 tablets (1,000 mg) by mouth every 6 hours At 8:00 am, 2:00 pm, 8:00 pm the day prior to your surgery with flagyl and zofran. 6 tablet 0     ondansetron (ZOFRAN) 4 MG tablet Take 1 tablet (4 mg) by mouth every 6 hours At 8:00 am, 2:00 pm, 8:00 pm the day prior to your surgery with neomycin and flagyl. 3 tablet 0    polyethylene glycol (MIRALAX) 17 GM/Dose powder Please take 238 grams mixed with 64 oz of Gatorade at 4pm the night before surgery 238 g 0       Allergies  No Known Allergies    Social History  Social History     Socioeconomic History    Marital status: Single     Spouse name: Not on file    Number of children: Not on file    Years of education: Not on file    Highest education level: Not on file   Occupational History    Not on file   Tobacco Use    Smoking status: Never    Smokeless tobacco: Never   Substance and Sexual Activity    Alcohol use: Not Currently    Drug use: Never    Sexual activity: Not on file   Other Topics Concern    Not on file   Social History Narrative    Not on file     Social Determinants of Health     Financial Resource Strain: Not on file   Food Insecurity: Not on file   Transportation Needs: Not on file   Physical Activity: Not on file   Stress: Not on file   Social Connections: Not on file   Interpersonal Safety: Not on file   Housing Stability: Not on file       Family History  Family History   Problem Relation Age of Onset    Anemia Mother     Thyroid Disease Mother     Sleep Apnea Father     Factor V Leiden deficiency Father     Pulmonary Embolism Father     Deep Vein Thrombosis Father     Colon Cancer Maternal Grandmother     Thyroid Disease Maternal Grandmother     Hypertension Maternal Grandfather     Alcoholism Paternal Grandfather     Anesthesia Reaction No family hx of     Bleeding Disorder No family hx of        Review of Systems  The complete review of systems is negative other than noted in the HPI or here.   Anesthesia Evaluation   Pt has had prior anesthetic. Type: General and MAC (Wakes up crying).    No history of anesthetic complications       ROS/MED HX  ENT/Pulmonary:     (+)     MANDEEP risk factors,    obese,                             (-) tobacco use and recent URI   Neurologic:    (-) no seizures and no CVA   Cardiovascular: Comment: BP range normal    (+)  - -   -  - -                                 No previous cardiac testing  (-) taking anticoagulants/antiplatelets, FLORES and arrhythmias   METS/Exercise Tolerance: 4 - Raking leaves, gardening Comment: Still recovering from chemotherapy but has been able to walk distance, shop, visit friends   Hematologic: Comments: Father with Factor V Leiden and PE/DVT. Patient tested neg   (-) history of blood clots and history of blood transfusion   Musculoskeletal:  - neg musculoskeletal ROS     GI/Hepatic: Comment: Colostomy status    (+)        bowel prep,         (-) GERD   Renal/Genitourinary:  - neg Renal ROS     Endo:     (+)               Obesity,       Psychiatric/Substance Use:    (-) psychiatric history   Infectious Disease:  - neg infectious disease ROS     Malignancy:   (+) Malignancy, History of GI.GI CA  Active status post Chemo and Surgery.      Other:  - neg other ROS          Virtual visit -  No vitals were obtained    Physical Exam  Constitutional: Awake, alert, no apparent distress, and appears stated age. Mother accompanies.  HENT: Normocephalic  Respiratory: non labored breathing; no cough  Neurologic: Oriented to name, place and time.   Neuropsychiatric: Calm, cooperative. Normal affect.      Prior Labs/Diagnostic Studies   All labs and imaging personally reviewed     EKG: Not indicated    Imaging scanned in    The patient's records and results personally reviewed by this provider.     Outside records reviewed from: Care Everywhere    Assessment    Rachell Stacy is a 33 year old female seen as a PAC referral for risk assessment and optimization for anesthesia.    Plan/Recommendations  Pt will be optimized for the proposed procedure.  See below for details on the assessment, risk, and preoperative recommendations    NEUROLOGY  - No history of TIA,  "CVA or seizure    Post Op delirium risk factors:  No risk identified    ENT  - No current airway concerns.  Will need to be reassessed day of surgery.  Mallampati: Unable to assess  TM: Unable to assess  Anesthesia airway notes in Care Everywhere    CARDIAC  No cardiac history, symptoms or meds. BP range normal. Denies chest pain or DYSPNEA ON EXERTION. Some palpations during chemotherapy. Is still recovering from therapy, but has improving energy and endurance. Able to walk some distance, shop and visit friends.  - METS (Metabolic Equivalents)~4    RCRI: 0.9% risk of serious cardiac events    PULMONARY  Denies asthma, cough or shortness of breath  Low risk for MANDEEP  - Tobacco History    History   Smoking Status    Never   Smokeless Tobacco    Never       GI: Denies GERD  LLQ colostomy  PONV Medium Risk  Total Score: 2           1 AN PONV: Pt is Female    1 AN PONV: Patient is not a current smoker        /RENAL  - Baseline Creatinine  Last 0.70    ENDOCRINE    - BMI: Estimated body mass index is 38.25 kg/m  as calculated from the following:    Height as of this encounter: 1.753 m (5' 9\").    Weight as of this encounter: 117.5 kg (259 lb).  Obesity (BMI >30)  - No history of Diabetes Mellitus    HEME: 4.5%  Family history of father with PE/DVT and Factor 5 Leiden. Patient tested negative  No personal history of blood clots  Denies personal or family history of bleeding disorder  Denies history of blood transfusion   Type and screen and updated labs to be drawn later today    ACCESS: Right upper chest port    Different anesthesia methods/types have been discussed with the patient, but they are aware that the final plan will be decided by the assigned anesthesia provider on the date of service.  Patient was reviewed by Dr. Leach    The patient is optimized for their procedure. AVS with information on surgery time/arrival time, meds and NPO status given by nursing staff. No further diagnostic testing " indicated.    Please refer to the physical examination documented by the anesthesiologist in the anesthesia record on the day of surgery.    ADDENDUM: Updated labs 3/28/24 by surgery team  Prealbumin 25.8  Last Comprehensive Metabolic Panel:  Sodium   Date Value Ref Range Status   03/28/2024 139 135 - 145 mmol/L Final     Comment:     Reference intervals for this test were updated on 09/26/2023 to more accurately reflect our healthy population. There may be differences in the flagging of prior results with similar values performed with this method. Interpretation of those prior results can be made in the context of the updated reference intervals.      Potassium   Date Value Ref Range Status   03/28/2024 4.3 3.4 - 5.3 mmol/L Final     Chloride   Date Value Ref Range Status   03/28/2024 104 98 - 107 mmol/L Final     Carbon Dioxide (CO2)   Date Value Ref Range Status   03/28/2024 21 (L) 22 - 29 mmol/L Final     Anion Gap   Date Value Ref Range Status   03/28/2024 14 7 - 15 mmol/L Final     Glucose   Date Value Ref Range Status   03/28/2024 99 70 - 99 mg/dL Final     Urea Nitrogen   Date Value Ref Range Status   03/28/2024 14.8 6.0 - 20.0 mg/dL Final     Creatinine   Date Value Ref Range Status   03/28/2024 0.75 0.51 - 0.95 mg/dL Final     GFR Estimate   Date Value Ref Range Status   03/28/2024 >90 >60 mL/min/1.73m2 Final     Calcium   Date Value Ref Range Status   03/28/2024 9.8 8.6 - 10.0 mg/dL Final     Bilirubin Total   Date Value Ref Range Status   03/28/2024 0.2 <=1.2 mg/dL Final     Alkaline Phosphatase   Date Value Ref Range Status   03/28/2024 106 40 - 150 U/L Final     Comment:     Reference intervals for this test were updated on 11/14/2023 to more accurately reflect our healthy population. There may be differences in the flagging of prior results with similar values performed with this method. Interpretation of those prior results can be made in the context of the updated reference intervals.     ALT   Date  Value Ref Range Status   03/28/2024 21 0 - 50 U/L Final     Comment:     Reference intervals for this test were updated on 6/12/2023 to more accurately reflect our healthy population. There may be differences in the flagging of prior results with similar values performed with this method. Interpretation of those prior results can be made in the context of the updated reference intervals.       AST   Date Value Ref Range Status   03/28/2024 22 0 - 45 U/L Final     Comment:     Reference intervals for this test were updated on 6/12/2023 to more accurately reflect our healthy population. There may be differences in the flagging of prior results with similar values performed with this method. Interpretation of those prior results can be made in the context of the updated reference intervals.     Lab Results   Component Value Date    WBC 8.4 03/28/2024     Lab Results   Component Value Date    RBC 3.54 03/28/2024     Lab Results   Component Value Date    HGB 10.9 03/28/2024     Lab Results   Component Value Date    HCT 34.3 03/28/2024     Lab Results   Component Value Date    MCV 97 03/28/2024     Lab Results   Component Value Date    MCH 30.8 03/28/2024     Lab Results   Component Value Date    MCHC 31.8 03/28/2024     Lab Results   Component Value Date    RDW 16.7 03/28/2024     Lab Results   Component Value Date     03/28/2024     Type and screen     Video-Visit Details    Type of service:  Video Visit    Provider received verbal consent for a Video Visit from the patient? Yes   Video Start Time: 1:52pm   Video End Time: 2:04pm     Originating Location (pt. Location): Home    Distant Location (provider location):  Off-site  Mode of Communication:  Video Conference via [employer]  On the day of service:     Prep time: 12 minutes  Visit time: 12 minutes  Documentation time: 15 minutes  ------------------------------------------  Total time: 39 minutes      MARISOL Alvarenga CNS  Preoperative Assessment  [facility]  Clinic and Surgery Center  Phone: [phone]  Fax: [phone]

## 2024-03-28 NOTE — PATIENT INSTRUCTIONS
Preparing for Your Surgery      Name:  Rachell Stacy   MRN:  [id number]   :  1990   Today's Date:  3/28/2024       Arriving for surgery:  Surgery date:  24  Arrival time:  12:30 pm    Please come to:     [facility] [address]  [address] [place]  [address]      The [facility] Patient /Visitor Ramp is located at [address]. Patients and visitors who self-park will receive the reduced hospital parking rate. If the Patient /Visitor Ramp is full, please follow the signs to the [employer] parking located at the main hospital entrance.     parking is available ( 24 hours/ 7 days a week)    Discounted parking pass options are available for patients and visitors. They can be purchased at the [employer] desk at the main hospital entrance.    -  Stop at the security desk and they will direct surgery patients to Registration, and then the 3rd floor Surgery Waiting Room. [phone]     -  If you are in need of directions, wheelchair or escort please stop at the Information/security desk in the lobby.       What can I eat or drink?  -  Follow [facility] instructions for starting Bowel prep instructions and Clear Liquid diet.  -  You may have clear liquids until 2 hours prior to arrival time. (Until 10:30 am)    Examples of clear liquids:  Water  Clear broth  Juices (apple, white grape, white cranberry  and cider) without pulp  Noncarbonated, powder based beverages  (lemonade and Vishal-Aid)  Sodas (Sprite, 7-Up, ginger ale and seltzer)  Coffee or tea (without milk or cream)  Gatorade    -  No Alcohol or cannabis products for at least 24 hours before surgery.     Which medicines can I take?  Hold Aspirin for 7 days before surgery.   Hold Multivitamins for 7 days before surgery.  Hold Supplements for 7 days before surgery.  Hold Ibuprofen (Advil, Motrin) for 1 day before surgery--unless otherwise directed by  surgeon.  Hold Naproxen (Aleve) for 4 days before surgery.  Acetaminophen (Tylenol) is okay to take if needed.    -  PLEASE TAKE these medications the day of surgery:  Acetaminophen (Tylenol) if needed    How do I prepare myself?  - Please take 2 showers (one the night prior to surgery and one the morning of surgery) using Scrubcare or Hibiclens soap.    Use this soap only from the neck to your toes.     Leave the soap on your skin for one minute--then rinse thoroughly.      You may use your own shampoo and conditioner. No other hair products.   - Please remove all jewelry and body piercings.  - No lotions, deodorants or fragrance.  - No makeup or fingernail polish.   - Bring your ID and insurance card.    -If you use a CPAP machine, please bring the CPAP machine, tubing, and mask to hospital.    -If you have a Deep Brain Stimulator, Spinal Cord Stimulator, or any Neuro Stimulator device---you must bring the remote control to the hospital.      ALL PATIENTS GOING HOME THE SAME DAY OF SURGERY ARE REQUIRED TO HAVE A RESPONSIBLE ADULT TO DRIVE AND BE IN ATTENDANCE WITH THEM FOR 24 HOURS FOLLOWING SURGERY.    Covid testing policy as of 12/06/2022  Your surgeon will notify and schedule you for a COVID test if one is needed before surgery--please direct any questions or COVID symptoms to your surgeon      Questions or Concerns:    - For any questions regarding the day of surgery or your hospital stay, please contact the Pre Admission Nursing Office at [phone].       - If you have health changes between today and your surgery, please call your surgeon.       - For questions after surgery, please call your surgeons office.           Current Visitor Guidelines    You may have 2 visitors in the pre op area.    Visiting hours: 8 a.m. to 8:30 p.m.    Patients confirmed or suspected to have symptoms of COVID 19 or flu:     No visitors allowed for adult patients.   Children (under age 18) can have 1 named visitor.     People  who are sick or showing symptoms of COVID 19 or flu:    Are not allowed to visit patients--we can only make exceptions in special situations.       Please follow these guidelines for your visit:          Please maintain social distance          Masking is optional--however at times you may be asked to wear a mask for the safety of yourself and others     Clean your hands with alcohol hand . Do this when you arrive at and leave the building and patient room,    And again after you touch your mask or anything in the room.     Go directly to and from the room you are visiting.     Stay in the patient s room during your visit. Limit going to other places in the hospital as much as possible     Leave bags and jackets at home or in the car.     For everyone s health, please don t come and go during your visit. That includes for smoking   during your visit.

## 2024-03-28 NOTE — PROGRESS NOTES
Rachell is a 33 year old who is being evaluated via a billable video visit.    How would you like to obtain your AVS? MyChart  If the video visit is dropped, the invitation should be resent by: Text to cell phone: [phone]        Cedrick Lovett is a 33 year old, presenting for the following health issues:  Pre-Op Exam        HPI         Physical Exam

## 2024-03-29 ENCOUNTER — TEAM CONFERENCE (OUTPATIENT)
Dept: SURGERY | Facility: CLINIC | Age: 34
End: 2024-03-29
Payer: COMMERCIAL

## 2024-03-29 ENCOUNTER — TELEPHONE (OUTPATIENT)
Dept: SURGERY | Facility: CLINIC | Age: 34
End: 2024-03-29
Payer: COMMERCIAL

## 2024-03-29 LAB
ALBUMIN SERPL BCG-MCNC: 4.2 G/DL (ref 3.5–5.2)
ALP SERPL-CCNC: 106 U/L (ref 40–150)
ALT SERPL W P-5'-P-CCNC: 21 U/L (ref 0–50)
ANION GAP SERPL CALCULATED.3IONS-SCNC: 14 MMOL/L (ref 7–15)
AST SERPL W P-5'-P-CCNC: 22 U/L (ref 0–45)
BILIRUB SERPL-MCNC: 0.2 MG/DL
BUN SERPL-MCNC: 14.8 MG/DL (ref 6–20)
CALCIUM SERPL-MCNC: 9.8 MG/DL (ref 8.6–10)
CHLORIDE SERPL-SCNC: 104 MMOL/L (ref 98–107)
CREAT SERPL-MCNC: 0.75 MG/DL (ref 0.51–0.95)
DEPRECATED HCO3 PLAS-SCNC: 21 MMOL/L (ref 22–29)
EGFRCR SERPLBLD CKD-EPI 2021: >90 ML/MIN/1.73M2
GLUCOSE SERPL-MCNC: 99 MG/DL (ref 70–99)
POTASSIUM SERPL-SCNC: 4.3 MMOL/L (ref 3.4–5.3)
PREALB SERPL-MCNC: 25.8 MG/DL (ref 20–40)
PROT SERPL-MCNC: 7.4 G/DL (ref 6.4–8.3)
SODIUM SERPL-SCNC: 139 MMOL/L (ref 135–145)

## 2024-03-29 NOTE — TELEPHONE ENCOUNTER
Spoke with patient, provided her updated surgery start and arrival times on Monday April 1, 2024:    Arrive at 8:00 AM    Surgery will start at 10:00 AM    Also sent this information to patient in a [employer] message.    Patient expressed understanding.    Rachell Hough  Meli-op Coordinator  [facility]  Direct Phone: [phone]

## 2024-03-29 NOTE — PROGRESS NOTES
COLON AND RECTAL SURGERY HUDDLE:    Patient was reviewed in preparation for their surgery the following was reviewed and has been completed:    Surgeon: Dr. Kendrick Hernandez    Surgery & Date: 4/1  Exploratory laparotomy, cytoreduction colostomy takedown possible ileostomy placement     Last MD Note: reviewed    Anesthesia Type: General    Other Providers: Yes Nakib for cysto/stents    PAC: Yes    WOC: Yes-- in pre op approved by Dr. Hernandez     Labs: Yes    Bowel Prep: Yes MiraLAX / Gatorade , Antibiotic, and Magnesium Citrate    Packet: Yes    Imaging: N/A    Post-Op Appointments: Yes    Pre op soap: Yes Questions about shower: no    Is patient on TPN?: N/A   If yes, I contacted the TPN pharmacist by paging the  pharmacy at [phone] or calling [phone]. I also contacted Rosalba GARCIA with inpatient colon and rectal team.     Pre op call complete: Yes

## 2024-04-01 ENCOUNTER — HOSPITAL ENCOUNTER (INPATIENT)
Facility: CLINIC | Age: 34
LOS: 3 days | Discharge: HOME | End: 2024-04-04
Attending: UROLOGY | Admitting: SURGERY
Payer: COMMERCIAL

## 2024-04-01 ENCOUNTER — ANESTHESIA (OUTPATIENT)
Dept: SURGERY | Facility: CLINIC | Age: 34
End: 2024-04-01
Payer: COMMERCIAL

## 2024-04-01 DIAGNOSIS — G89.18 ACUTE POST-OPERATIVE PAIN: Primary | ICD-10-CM

## 2024-04-01 PROBLEM — C78.6 PERITONEAL CARCINOMATOSIS (H): Status: ACTIVE | Noted: 2024-04-01

## 2024-04-01 LAB
GLUCOSE BLDC GLUCOMTR-MCNC: 102 MG/DL (ref 70–99)
PLATELET # BLD AUTO: 273 10E3/UL (ref 150–450)

## 2024-04-01 PROCEDURE — 52005 CYSTO W/URTRL CATHJ: CPT | Performed by: ANESTHESIOLOGY

## 2024-04-01 PROCEDURE — 250N000009 HC RX 250: Performed by: NURSE ANESTHETIST, CERTIFIED REGISTERED

## 2024-04-01 PROCEDURE — 258N000003 HC RX IP 258 OP 636: Performed by: NURSE ANESTHETIST, CERTIFIED REGISTERED

## 2024-04-01 PROCEDURE — 85049 AUTOMATED PLATELET COUNT: CPT | Performed by: SURGERY

## 2024-04-01 PROCEDURE — 710N000009 HC RECOVERY PHASE 1, LEVEL 1, PER MIN: Performed by: UROLOGY

## 2024-04-01 PROCEDURE — 250N000024 HC ISOFLURANE, PER MIN: Performed by: UROLOGY

## 2024-04-01 PROCEDURE — 0WQF0ZZ REPAIR ABDOMINAL WALL, OPEN APPROACH: ICD-10-PCS | Performed by: SURGERY

## 2024-04-01 PROCEDURE — 88302 TISSUE EXAM BY PATHOLOGIST: CPT | Mod: TC | Performed by: SURGERY

## 2024-04-01 PROCEDURE — 120N000002 HC R&B MED SURG/OB UMMC

## 2024-04-01 PROCEDURE — 0DBW0ZZ EXCISION OF PERITONEUM, OPEN APPROACH: ICD-10-PCS | Performed by: SURGERY

## 2024-04-01 PROCEDURE — 250N000011 HC RX IP 250 OP 636: Performed by: STUDENT IN AN ORGANIZED HEALTH CARE EDUCATION/TRAINING PROGRAM

## 2024-04-01 PROCEDURE — 250N000011 HC RX IP 250 OP 636: Performed by: NURSE ANESTHETIST, CERTIFIED REGISTERED

## 2024-04-01 PROCEDURE — 88304 TISSUE EXAM BY PATHOLOGIST: CPT | Mod: 26 | Performed by: PATHOLOGY

## 2024-04-01 PROCEDURE — 0DBE0ZZ EXCISION OF LARGE INTESTINE, OPEN APPROACH: ICD-10-PCS | Performed by: SURGERY

## 2024-04-01 PROCEDURE — 0DSM0ZZ REPOSITION DESCENDING COLON, OPEN APPROACH: ICD-10-PCS | Performed by: SURGERY

## 2024-04-01 PROCEDURE — 88302 TISSUE EXAM BY PATHOLOGIST: CPT | Mod: 26 | Performed by: PATHOLOGY

## 2024-04-01 PROCEDURE — 44626 REPAIR BOWEL OPENING: CPT | Mod: GC | Performed by: SURGERY

## 2024-04-01 PROCEDURE — C9290 INJ, BUPIVACAINE LIPOSOME: HCPCS | Performed by: STUDENT IN AN ORGANIZED HEALTH CARE EDUCATION/TRAINING PROGRAM

## 2024-04-01 PROCEDURE — C1769 GUIDE WIRE: HCPCS | Performed by: UROLOGY

## 2024-04-01 PROCEDURE — 250N000011 HC RX IP 250 OP 636: Performed by: SURGERY

## 2024-04-01 PROCEDURE — 250N000011 HC RX IP 250 OP 636: Mod: JZ | Performed by: SURGERY

## 2024-04-01 PROCEDURE — C1758 CATHETER, URETERAL: HCPCS | Performed by: UROLOGY

## 2024-04-01 PROCEDURE — 88305 TISSUE EXAM BY PATHOLOGIST: CPT | Mod: 26 | Performed by: PATHOLOGY

## 2024-04-01 PROCEDURE — 36415 COLL VENOUS BLD VENIPUNCTURE: CPT | Performed by: SURGERY

## 2024-04-01 PROCEDURE — 52005 CYSTO W/URTRL CATHJ: CPT | Mod: GC | Performed by: UROLOGY

## 2024-04-01 PROCEDURE — 52005 CYSTO W/URTRL CATHJ: CPT | Performed by: NURSE ANESTHETIST, CERTIFIED REGISTERED

## 2024-04-01 PROCEDURE — 272N000001 HC OR GENERAL SUPPLY STERILE: Performed by: UROLOGY

## 2024-04-01 PROCEDURE — 999N000141 HC STATISTIC PRE-PROCEDURE NURSING ASSESSMENT: Performed by: UROLOGY

## 2024-04-01 PROCEDURE — 250N000013 HC RX MED GY IP 250 OP 250 PS 637: Performed by: SURGERY

## 2024-04-01 PROCEDURE — 258N000003 HC RX IP 258 OP 636: Performed by: SURGERY

## 2024-04-01 PROCEDURE — 370N000017 HC ANESTHESIA TECHNICAL FEE, PER MIN: Performed by: UROLOGY

## 2024-04-01 PROCEDURE — 360N000077 HC SURGERY LEVEL 4, PER MIN: Performed by: UROLOGY

## 2024-04-01 PROCEDURE — 0DBU0ZZ EXCISION OF OMENTUM, OPEN APPROACH: ICD-10-PCS | Performed by: SURGERY

## 2024-04-01 PROCEDURE — 49203 PR EXCISION/DESTRUCTION OPEN ABDOMINAL TUMORS 5 CM: CPT | Mod: GC | Performed by: SURGERY

## 2024-04-01 PROCEDURE — 88307 TISSUE EXAM BY PATHOLOGIST: CPT | Mod: 26 | Performed by: PATHOLOGY

## 2024-04-01 RX ORDER — ONDANSETRON 2 MG/ML
4 INJECTION INTRAMUSCULAR; INTRAVENOUS ONCE
Status: DISCONTINUED | OUTPATIENT
Start: 2024-04-01 | End: 2024-04-01 | Stop reason: HOSPADM

## 2024-04-01 RX ORDER — FENTANYL CITRATE 50 UG/ML
INJECTION, SOLUTION INTRAMUSCULAR; INTRAVENOUS PRN
Status: DISCONTINUED | OUTPATIENT
Start: 2024-04-01 | End: 2024-04-01

## 2024-04-01 RX ORDER — ONDANSETRON 4 MG/1
4 TABLET, ORALLY DISINTEGRATING ORAL EVERY 6 HOURS PRN
Status: DISCONTINUED | OUTPATIENT
Start: 2024-04-01 | End: 2024-04-04 | Stop reason: HOSPADM

## 2024-04-01 RX ORDER — NALOXONE HYDROCHLORIDE 0.4 MG/ML
0.4 INJECTION, SOLUTION INTRAMUSCULAR; INTRAVENOUS; SUBCUTANEOUS
Status: DISCONTINUED | OUTPATIENT
Start: 2024-04-01 | End: 2024-04-01 | Stop reason: HOSPADM

## 2024-04-01 RX ORDER — HEPARIN SODIUM 5000 [USP'U]/.5ML
5000 INJECTION, SOLUTION INTRAVENOUS; SUBCUTANEOUS
Status: COMPLETED | OUTPATIENT
Start: 2024-04-01 | End: 2024-04-01

## 2024-04-01 RX ORDER — ONDANSETRON 4 MG/1
4 TABLET, ORALLY DISINTEGRATING ORAL EVERY 30 MIN PRN
Status: DISCONTINUED | OUTPATIENT
Start: 2024-04-01 | End: 2024-04-01 | Stop reason: HOSPADM

## 2024-04-01 RX ORDER — ONDANSETRON 2 MG/ML
4 INJECTION INTRAMUSCULAR; INTRAVENOUS EVERY 6 HOURS PRN
Status: DISCONTINUED | OUTPATIENT
Start: 2024-04-01 | End: 2024-04-04 | Stop reason: HOSPADM

## 2024-04-01 RX ORDER — NALOXONE HYDROCHLORIDE 0.4 MG/ML
0.4 INJECTION, SOLUTION INTRAMUSCULAR; INTRAVENOUS; SUBCUTANEOUS
Status: DISCONTINUED | OUTPATIENT
Start: 2024-04-01 | End: 2024-04-04 | Stop reason: HOSPADM

## 2024-04-01 RX ORDER — SODIUM CHLORIDE, SODIUM LACTATE, POTASSIUM CHLORIDE, CALCIUM CHLORIDE 600; 310; 30; 20 MG/100ML; MG/100ML; MG/100ML; MG/100ML
INJECTION, SOLUTION INTRAVENOUS CONTINUOUS
Status: DISCONTINUED | OUTPATIENT
Start: 2024-04-01 | End: 2024-04-02

## 2024-04-01 RX ORDER — SODIUM CHLORIDE, SODIUM LACTATE, POTASSIUM CHLORIDE, CALCIUM CHLORIDE 600; 310; 30; 20 MG/100ML; MG/100ML; MG/100ML; MG/100ML
INJECTION, SOLUTION INTRAVENOUS CONTINUOUS PRN
Status: DISCONTINUED | OUTPATIENT
Start: 2024-04-01 | End: 2024-04-01

## 2024-04-01 RX ORDER — HYDROMORPHONE HCL IN WATER/PF 6 MG/30 ML
0.2 PATIENT CONTROLLED ANALGESIA SYRINGE INTRAVENOUS EVERY 5 MIN PRN
Status: DISCONTINUED | OUTPATIENT
Start: 2024-04-01 | End: 2024-04-01 | Stop reason: HOSPADM

## 2024-04-01 RX ORDER — PROPOFOL 10 MG/ML
INJECTION, EMULSION INTRAVENOUS PRN
Status: DISCONTINUED | OUTPATIENT
Start: 2024-04-01 | End: 2024-04-01

## 2024-04-01 RX ORDER — NALOXONE HYDROCHLORIDE 0.4 MG/ML
0.2 INJECTION, SOLUTION INTRAMUSCULAR; INTRAVENOUS; SUBCUTANEOUS
Status: DISCONTINUED | OUTPATIENT
Start: 2024-04-01 | End: 2024-04-01 | Stop reason: HOSPADM

## 2024-04-01 RX ORDER — ACETAMINOPHEN 500 MG
1000 TABLET ORAL EVERY 6 HOURS
Qty: 24 TABLET | Refills: 0 | Status: DISCONTINUED | OUTPATIENT
Start: 2024-04-01 | End: 2024-04-04 | Stop reason: HOSPADM

## 2024-04-01 RX ORDER — SODIUM CHLORIDE, SODIUM LACTATE, POTASSIUM CHLORIDE, CALCIUM CHLORIDE 600; 310; 30; 20 MG/100ML; MG/100ML; MG/100ML; MG/100ML
INJECTION, SOLUTION INTRAVENOUS CONTINUOUS
Status: DISCONTINUED | OUTPATIENT
Start: 2024-04-01 | End: 2024-04-01 | Stop reason: HOSPADM

## 2024-04-01 RX ORDER — LIDOCAINE 40 MG/G
CREAM TOPICAL
Status: DISCONTINUED | OUTPATIENT
Start: 2024-04-01 | End: 2024-04-04 | Stop reason: HOSPADM

## 2024-04-01 RX ORDER — CEFAZOLIN SODIUM/WATER 2 G/20 ML
2 SYRINGE (ML) INTRAVENOUS SEE ADMIN INSTRUCTIONS
Status: DISCONTINUED | OUTPATIENT
Start: 2024-04-01 | End: 2024-04-01 | Stop reason: HOSPADM

## 2024-04-01 RX ORDER — ACETAMINOPHEN 325 MG/1
975 TABLET ORAL ONCE
Status: COMPLETED | OUTPATIENT
Start: 2024-04-01 | End: 2024-04-01

## 2024-04-01 RX ORDER — HYDROMORPHONE HCL IN WATER/PF 6 MG/30 ML
0.4 PATIENT CONTROLLED ANALGESIA SYRINGE INTRAVENOUS EVERY 5 MIN PRN
Status: DISCONTINUED | OUTPATIENT
Start: 2024-04-01 | End: 2024-04-01 | Stop reason: HOSPADM

## 2024-04-01 RX ORDER — ONDANSETRON 2 MG/ML
INJECTION INTRAMUSCULAR; INTRAVENOUS PRN
Status: DISCONTINUED | OUTPATIENT
Start: 2024-04-01 | End: 2024-04-01

## 2024-04-01 RX ORDER — DEXAMETHASONE SODIUM PHOSPHATE 4 MG/ML
INJECTION, SOLUTION INTRA-ARTICULAR; INTRALESIONAL; INTRAMUSCULAR; INTRAVENOUS; SOFT TISSUE PRN
Status: DISCONTINUED | OUTPATIENT
Start: 2024-04-01 | End: 2024-04-01

## 2024-04-01 RX ORDER — HEPARIN SODIUM 5000 [USP'U]/.5ML
5000 INJECTION, SOLUTION INTRAVENOUS; SUBCUTANEOUS EVERY 8 HOURS
Status: DISCONTINUED | OUTPATIENT
Start: 2024-04-02 | End: 2024-04-02

## 2024-04-01 RX ORDER — FENTANYL CITRATE 50 UG/ML
50 INJECTION, SOLUTION INTRAMUSCULAR; INTRAVENOUS EVERY 5 MIN PRN
Status: DISCONTINUED | OUTPATIENT
Start: 2024-04-01 | End: 2024-04-01 | Stop reason: HOSPADM

## 2024-04-01 RX ORDER — NALOXONE HYDROCHLORIDE 0.4 MG/ML
0.1 INJECTION, SOLUTION INTRAMUSCULAR; INTRAVENOUS; SUBCUTANEOUS
Status: DISCONTINUED | OUTPATIENT
Start: 2024-04-01 | End: 2024-04-01 | Stop reason: HOSPADM

## 2024-04-01 RX ORDER — LIDOCAINE HYDROCHLORIDE 20 MG/ML
INJECTION, SOLUTION INFILTRATION; PERINEURAL PRN
Status: DISCONTINUED | OUTPATIENT
Start: 2024-04-01 | End: 2024-04-01

## 2024-04-01 RX ORDER — FLUMAZENIL 0.1 MG/ML
0.2 INJECTION, SOLUTION INTRAVENOUS
Status: DISCONTINUED | OUTPATIENT
Start: 2024-04-01 | End: 2024-04-01 | Stop reason: HOSPADM

## 2024-04-01 RX ORDER — NALOXONE HYDROCHLORIDE 0.4 MG/ML
0.2 INJECTION, SOLUTION INTRAMUSCULAR; INTRAVENOUS; SUBCUTANEOUS
Status: DISCONTINUED | OUTPATIENT
Start: 2024-04-01 | End: 2024-04-04 | Stop reason: HOSPADM

## 2024-04-01 RX ORDER — CEFAZOLIN SODIUM/WATER 2 G/20 ML
2 SYRINGE (ML) INTRAVENOUS
Status: COMPLETED | OUTPATIENT
Start: 2024-04-01 | End: 2024-04-01

## 2024-04-01 RX ORDER — METRONIDAZOLE 500 MG/100ML
500 INJECTION, SOLUTION INTRAVENOUS
Status: COMPLETED | OUTPATIENT
Start: 2024-04-01 | End: 2024-04-01

## 2024-04-01 RX ORDER — FENTANYL CITRATE 50 UG/ML
25 INJECTION, SOLUTION INTRAMUSCULAR; INTRAVENOUS EVERY 5 MIN PRN
Status: DISCONTINUED | OUTPATIENT
Start: 2024-04-01 | End: 2024-04-01 | Stop reason: HOSPADM

## 2024-04-01 RX ORDER — BUPIVACAINE HYDROCHLORIDE 2.5 MG/ML
INJECTION, SOLUTION EPIDURAL; INFILTRATION; INTRACAUDAL
Status: COMPLETED | OUTPATIENT
Start: 2024-04-01 | End: 2024-04-01

## 2024-04-01 RX ORDER — FENTANYL CITRATE 50 UG/ML
25-50 INJECTION, SOLUTION INTRAMUSCULAR; INTRAVENOUS
Status: DISCONTINUED | OUTPATIENT
Start: 2024-04-01 | End: 2024-04-01 | Stop reason: HOSPADM

## 2024-04-01 RX ORDER — ONDANSETRON 2 MG/ML
4 INJECTION INTRAMUSCULAR; INTRAVENOUS EVERY 30 MIN PRN
Status: DISCONTINUED | OUTPATIENT
Start: 2024-04-01 | End: 2024-04-01 | Stop reason: HOSPADM

## 2024-04-01 RX ADMIN — ACETAMINOPHEN 1000 MG: 500 TABLET ORAL at 20:24

## 2024-04-01 RX ADMIN — Medication: at 16:42

## 2024-04-01 RX ADMIN — FENTANYL CITRATE 50 MCG: 50 INJECTION INTRAMUSCULAR; INTRAVENOUS at 11:22

## 2024-04-01 RX ADMIN — Medication 2 G: at 11:45

## 2024-04-01 RX ADMIN — FENTANYL CITRATE 50 MCG: 50 INJECTION INTRAMUSCULAR; INTRAVENOUS at 11:38

## 2024-04-01 RX ADMIN — PROPOFOL 50 MG: 10 INJECTION, EMULSION INTRAVENOUS at 15:02

## 2024-04-01 RX ADMIN — HYDROMORPHONE HYDROCHLORIDE 0.5 MG: 1 INJECTION, SOLUTION INTRAMUSCULAR; INTRAVENOUS; SUBCUTANEOUS at 13:51

## 2024-04-01 RX ADMIN — ACETAMINOPHEN 975 MG: 325 TABLET, FILM COATED ORAL at 08:24

## 2024-04-01 RX ADMIN — FENTANYL CITRATE 100 MCG: 50 INJECTION INTRAMUSCULAR; INTRAVENOUS at 12:49

## 2024-04-01 RX ADMIN — PROPOFOL 75 MCG/KG/MIN: 10 INJECTION, EMULSION INTRAVENOUS at 12:48

## 2024-04-01 RX ADMIN — HYDROMORPHONE HYDROCHLORIDE 0.5 MG: 1 INJECTION, SOLUTION INTRAMUSCULAR; INTRAVENOUS; SUBCUTANEOUS at 13:54

## 2024-04-01 RX ADMIN — BUPIVACAINE HYDROCHLORIDE 20 ML: 2.5 INJECTION, SOLUTION EPIDURAL; INFILTRATION; INTRACAUDAL; PERINEURAL at 10:56

## 2024-04-01 RX ADMIN — HEPARIN SODIUM 5000 UNITS: 5000 INJECTION, SOLUTION INTRAVENOUS; SUBCUTANEOUS at 10:18

## 2024-04-01 RX ADMIN — DEXMEDETOMIDINE HYDROCHLORIDE 8 MCG: 100 INJECTION, SOLUTION INTRAVENOUS at 14:18

## 2024-04-01 RX ADMIN — SODIUM CHLORIDE, POTASSIUM CHLORIDE, SODIUM LACTATE AND CALCIUM CHLORIDE: 600; 310; 30; 20 INJECTION, SOLUTION INTRAVENOUS at 23:35

## 2024-04-01 RX ADMIN — Medication 20 MG: at 12:05

## 2024-04-01 RX ADMIN — HYDROMORPHONE HYDROCHLORIDE 0.5 MG: 1 INJECTION, SOLUTION INTRAMUSCULAR; INTRAVENOUS; SUBCUTANEOUS at 14:17

## 2024-04-01 RX ADMIN — LIDOCAINE HYDROCHLORIDE 100 MG: 20 INJECTION, SOLUTION INFILTRATION; PERINEURAL at 11:39

## 2024-04-01 RX ADMIN — FENTANYL CITRATE 50 MCG: 50 INJECTION INTRAMUSCULAR; INTRAVENOUS at 14:36

## 2024-04-01 RX ADMIN — ONDANSETRON 4 MG: 2 INJECTION INTRAMUSCULAR; INTRAVENOUS at 14:02

## 2024-04-01 RX ADMIN — PHENYLEPHRINE HYDROCHLORIDE 100 MCG: 10 INJECTION INTRAVENOUS at 11:48

## 2024-04-01 RX ADMIN — METRONIDAZOLE 500 MG: 500 INJECTION, SOLUTION INTRAVENOUS at 09:00

## 2024-04-01 RX ADMIN — DEXMEDETOMIDINE HYDROCHLORIDE 8 MCG: 100 INJECTION, SOLUTION INTRAVENOUS at 12:43

## 2024-04-01 RX ADMIN — FENTANYL CITRATE 50 MCG: 50 INJECTION INTRAMUSCULAR; INTRAVENOUS at 12:39

## 2024-04-01 RX ADMIN — SUGAMMADEX 100 MG: 100 INJECTION, SOLUTION INTRAVENOUS at 14:30

## 2024-04-01 RX ADMIN — DEXMEDETOMIDINE HYDROCHLORIDE 8 MCG: 100 INJECTION, SOLUTION INTRAVENOUS at 11:35

## 2024-04-01 RX ADMIN — SODIUM CHLORIDE, POTASSIUM CHLORIDE, SODIUM LACTATE AND CALCIUM CHLORIDE: 600; 310; 30; 20 INJECTION, SOLUTION INTRAVENOUS at 14:57

## 2024-04-01 RX ADMIN — Medication 50 MG: at 11:39

## 2024-04-01 RX ADMIN — MIDAZOLAM 2 MG: 1 INJECTION INTRAMUSCULAR; INTRAVENOUS at 11:22

## 2024-04-01 RX ADMIN — FENTANYL CITRATE 50 MCG: 50 INJECTION, SOLUTION INTRAMUSCULAR; INTRAVENOUS at 11:00

## 2024-04-01 RX ADMIN — SODIUM CHLORIDE, POTASSIUM CHLORIDE, SODIUM LACTATE AND CALCIUM CHLORIDE: 600; 310; 30; 20 INJECTION, SOLUTION INTRAVENOUS at 16:30

## 2024-04-01 RX ADMIN — DEXMEDETOMIDINE HYDROCHLORIDE 8 MCG: 100 INJECTION, SOLUTION INTRAVENOUS at 11:30

## 2024-04-01 RX ADMIN — Medication 30 MG: at 13:14

## 2024-04-01 RX ADMIN — Medication 20 MG: at 12:51

## 2024-04-01 RX ADMIN — BUPIVACAINE 20 ML: 13.3 INJECTION, SUSPENSION, LIPOSOMAL INFILTRATION at 10:56

## 2024-04-01 RX ADMIN — MIDAZOLAM 1 MG: 1 INJECTION INTRAMUSCULAR; INTRAVENOUS at 10:59

## 2024-04-01 RX ADMIN — FENTANYL CITRATE 50 MCG: 50 INJECTION INTRAMUSCULAR; INTRAVENOUS at 14:33

## 2024-04-01 RX ADMIN — SODIUM CHLORIDE, POTASSIUM CHLORIDE, SODIUM LACTATE AND CALCIUM CHLORIDE: 600; 310; 30; 20 INJECTION, SOLUTION INTRAVENOUS at 11:22

## 2024-04-01 RX ADMIN — DEXAMETHASONE SODIUM PHOSPHATE 4 MG: 4 INJECTION, SOLUTION INTRA-ARTICULAR; INTRALESIONAL; INTRAMUSCULAR; INTRAVENOUS; SOFT TISSUE at 12:00

## 2024-04-01 RX ADMIN — PROPOFOL 150 MG: 10 INJECTION, EMULSION INTRAVENOUS at 11:39

## 2024-04-01 RX ADMIN — DEXMEDETOMIDINE HYDROCHLORIDE 8 MCG: 100 INJECTION, SOLUTION INTRAVENOUS at 14:55

## 2024-04-01 ASSESSMENT — ACTIVITIES OF DAILY LIVING (ADL)
ADLS_ACUITY_SCORE: 18
ADLS_ACUITY_SCORE: 19
ADLS_ACUITY_SCORE: 18
ADLS_ACUITY_SCORE: 18
ADLS_ACUITY_SCORE: 19
ADLS_ACUITY_SCORE: 18
ADLS_ACUITY_SCORE: 19
ADLS_ACUITY_SCORE: 18

## 2024-04-01 NOTE — ANESTHESIA PROCEDURE NOTES
Airway       Patient location during procedure: OR       Procedure Start/Stop Times: 4/1/2024 11:42 AM  Staff -        CRNA: Beckie Lees APRN CRNA       Performed By: CRNA  Consent for Airway        Urgency: elective  Indications and Patient Condition       Indications for airway management: ok-procedural       Induction type:intravenous       Mask difficulty assessment: 1 - vent by mask    Final Airway Details       Final airway type: endotracheal airway       Successful airway: ETT - single  Endotracheal Airway Details        ETT size (mm): 7.0       Cuffed: yes       Successful intubation technique: direct laryngoscopy       DL Blade Type: MAC 4       Grade View of Cords: 1       Adjucts: stylet       Position: Right       Measured from: gums/teeth       Secured at (cm): 19       Bite block used: None    Post intubation assessment        Placement verified by: capnometry        Number of attempts at approach: 1       Secured with: pink tape       Ease of procedure: easy       Dentition: Intact and Unchanged    Medication(s) Administered   Medication Administration Time: 4/1/2024 11:42 AM

## 2024-04-01 NOTE — ANESTHESIA CARE TRANSFER NOTE
Patient: Rachell Stacy    Procedure: Procedure(s):  Cystoscopy, with Ureteral Stent Placement  Exploratory laparotomy, cytoreduction  colostomy takedown possible ileostomy placement       Diagnosis: Peritoneal carcinomatosis (H) [C78.6]  Malignant neoplasm of sigmoid colon (H) [C18.7]  Diagnosis Additional Information: No value filed.    Anesthesia Type:   General     Note:    Oropharynx: oral airway in place  Level of Consciousness: drowsy  Oxygen Supplementation: nasal cannula  Level of Supplemental Oxygen (L/min / FiO2): 3  Independent Airway: airway patency satisfactory and stable  Dentition: dentition unchanged  Vital Signs Stable: post-procedure vital signs reviewed and stable  Report to RN Given: handoff report given  Patient transferred to: PACU    Handoff Report: Identifed the Patient, Identified the Reponsible Provider, Reviewed the pertinent medical history, Discussed the surgical course, Reviewed Intra-OP anesthesia mangement and issues during anesthesia, Set expectations for post-procedure period and Allowed opportunity for questions and acknowledgement of understanding      Vitals:  Vitals Value Taken Time   BP     Temp     Pulse 87 04/01/24 1529   Resp     SpO2 99 % 04/01/24 1529   Vitals shown include unfiled device data.    Electronically Signed By: MARISOL Preston CRNA  April 1, 2024  3:30 PM

## 2024-04-01 NOTE — ANESTHESIA POSTPROCEDURE EVALUATION
Patient: Rachell Stacy    Procedure: Procedure(s):  Cystoscopy, with Ureteral Stent Placement and Removal  Exploratory laparotomy, omentectomy, cytoreduction of peritoneal nodule, intra-op flexible sigmoidoscopy, excision of falciform ligament, splenic flexure mobilization and repair of parastomal hernia  colostomy takedown       Anesthesia Type:  General    Note:  Disposition: Inpatient   Postop Pain Control: Uneventful            Sign Out: Well controlled pain   PONV: No   Neuro/Psych: Uneventful            Sign Out: Acceptable/Baseline neuro status   Airway/Respiratory: Uneventful            Sign Out: Acceptable/Baseline resp. status   CV/Hemodynamics: Uneventful            Sign Out: Acceptable CV status; No obvious hypovolemia; No obvious fluid overload   Other NRE: NONE   DID A NON-ROUTINE EVENT OCCUR? No           Last vitals:  Vitals Value Taken Time   /95 04/01/24 1645   Temp 37.3  C (99.1  F) 04/01/24 1530   Pulse 89 04/01/24 1648   Resp 20 04/01/24 1648   SpO2 100 % 04/01/24 1648   Vitals shown include unfiled device data.    Electronically Signed By: Niko Chi MD  April 1, 2024  4:49 PM

## 2024-04-01 NOTE — OR NURSING
Dr Hernandez in PACU; saw patient; aware of drainage from penrose under primpore - okay to reinforce with ABD pad as drainage is expected.

## 2024-04-01 NOTE — ANESTHESIA PREPROCEDURE EVALUATION
Anesthesia Pre-Procedure Evaluation    Patient: Rachell Stacy   MRN: [id number] : 1990        Procedure : Procedure(s):  Cystoscopy, with Ureteral Stent Placement  Exploratory laparotomy, cytoreduction  colostomy takedown possible ileostomy placement          Past Medical History:   Diagnosis Date     Colostomy status (H)      Malignant neoplasm of sigmoid colon (H)      Peritoneal carcinomatosis (H)       Past Surgical History:   Procedure Laterality Date     Cystoscopy, placement of bilateral ureteral stents  2023     FLEXIBLE SIGMOIDOSCOPY       Sigmoid resection, colostomy, appendectomy  2023     [place] teeth extraction         No Known Allergies   Social History     Tobacco Use     Smoking status: Never     Smokeless tobacco: Never   Substance Use Topics     Alcohol use: Not Currently      Wt Readings from Last 1 Encounters:   24 124 kg (273 lb 5.9 oz)        Anesthesia Evaluation   Pt has had prior anesthetic. Type: General.    No history of anesthetic complications       ROS/MED HX  ENT/Pulmonary:       Neurologic:       Cardiovascular:       METS/Exercise Tolerance: >4 METS    Hematologic:     (+)      anemia,          Musculoskeletal:       GI/Hepatic:    (-) GERD   Renal/Genitourinary:       Endo:       Psychiatric/Substance Use:       Infectious Disease:       Malignancy:       Other:            Physical Exam    Airway        Mallampati: II   TM distance: > 3 FB   Neck ROM: full   Mouth opening: > 3 cm    Respiratory Devices and Support         Dental       (+) Minor Abnormalities - some fillings, tiny chips      Cardiovascular             Pulmonary               OUTSIDE LABS:  CBC:   Lab Results   Component Value Date    WBC 8.4 2024    HGB 10.9 (L) 2024    HCT 34.3 (L) 2024     2024     BMP:   Lab Results   Component Value Date     2024    POTASSIUM 4.3 2024    CHLORIDE 104 2024    CO2 21 (L) 2024    BUN 14.8  "03/28/2024    CR 0.75 03/28/2024     (H) 04/01/2024    GLC 99 03/28/2024     COAGS: No results found for: \"PTT\", \"INR\", \"FIBR\"  POC: No results found for: \"BGM\", \"HCG\", \"HCGS\"  HEPATIC:   Lab Results   Component Value Date    ALBUMIN 4.2 03/28/2024    PROTTOTAL 7.4 03/28/2024    ALT 21 03/28/2024    AST 22 03/28/2024    ALKPHOS 106 03/28/2024    BILITOTAL 0.2 03/28/2024     OTHER:   Lab Results   Component Value Date    ROBERT 9.8 03/28/2024       Anesthesia Plan    ASA Status:  3    NPO Status:  NPO Appropriate    Anesthesia Type: General.     - Airway: ETT   Induction: Intravenous.   Maintenance: Balanced.   Techniques and Equipment:     - Lines/Monitors: 2nd IV     - Blood: T&S     Consents    Anesthesia Plan(s) and associated risks, benefits, and realistic alternatives discussed. Questions answered and patient/representative(s) expressed understanding.     - Discussed:     - Discussed with:  Patient      - Extended Intubation/Ventilatory Support Discussed: No.      - Patient is DNR/DNI Status: No     Use of blood products discussed: Yes.     - Discussed with: Patient.     - Consented: consented to blood products     Postoperative Care    Pain management: Multi-modal analgesia, Peripheral nerve block (Single Shot).   PONV prophylaxis: Ondansetron (or other 5HT-3), Dexamethasone or Solumedrol     Comments:               Charlotte Azul MD    I have reviewed the pertinent notes and labs in the chart from the past 30 days and (re)examined the patient.  Any updates or changes from those notes are reflected in this note.              # Severe Obesity: Estimated body mass index is 40.37 kg/m  as calculated from the following:    Height as of this encounter: 1.753 m (5' 9\").    Weight as of this encounter: 124 kg (273 lb 5.9 oz).      "

## 2024-04-01 NOTE — OP NOTE
PREOPERATIVE DIAGNOSIS: sigmoid cancer     POSTOPERATIVE DIAGNOSIS: same    PROCEDURES PERFORMED:   1. Cystourethroscopy.   2. Placement of bilateralureteral stents  3. See colorectal operative report for details of their portion of the surgery    STAFF SURGEON: Rachele Dewitt M.D.    Assistant: Aramis Abernathy MD, pgy2      ANESTHESIA: General    ESTIMATED BLOOD LOSS: none    COMPLICATIONS: None.     BRIEF OPERATIVE INDICATIONS: Rachell Stacy is a 33 year old female undergoing surgery by the colorectal service for sigmoid cancer. They have asked that we place bilateralureteral stents to aid in ureteral identification.    DESCRIPTION OF PROCEDURE: After full informed voluntary consent was obtained, the patient was transported to the operating room, placed supine on the table. After adequate anesthesia was induced, she was placed in the lithotomy position and prepped and draped in the usual sterile fashion. A timeout was taken to confirm correct patient, procedure and laterality.     The case was started by inserting a 22-Monegasque rigid cystoscope sheath and 30-degree angle lens. The urethra was unremarkable. Once in the urinary bladder, media was clear. Pathologic bladder findings included: None.   The right and left ureteral orifices were cannulated with open-ended 5F ureteral catheters with the aid of a guidewire. The catheters were passed up the ureters 20cm. The scope was removed. A Urethral catheter was placed and the stents were tied to the catheter.  The case was turned over to the primary surgical service. The will notify us if there is any concern about the ureters; otherwise they will remove the stents at the conclusion of the case.     Aramis Abernathy MD    Patient was seen, evaluated and plan was formulated in conjunction with me and I agree with the above.  I was present for the critical portions of this procedure.  Rachele Deiwtt MD

## 2024-04-01 NOTE — ANESTHESIA PROCEDURE NOTES
TAP Procedure Note    Pre-Procedure   Staff -        Anesthesiologist:  Elian Davis MD       Resident/Fellow: Niko Chi MD       Performed By: resident and with residents       Procedure performed by resident/fellow/CRNA in presence of a teaching physician.         Location: pre-op       Procedure Start/Stop Times: 4/1/2024 10:56 AM and 4/1/2024 11:06 AM       Pre-Anesthestic Checklist: patient identified, IV checked, site marked, risks and benefits discussed, informed consent, monitors and equipment checked, pre-op evaluation, at physician/surgeon's request and post-op pain management  Timeout:       Correct Patient: Yes        Correct Procedure: Yes        Correct Site: Yes        Correct Position: Yes        Correct Laterality: Yes        Site Marked: Yes  Procedure Documentation  Procedure: TAP       Laterality: bilateral       Patient Position: supine       Skin prep: Chloraprep       Needle Gauge: 21.        Needle Length (millimeters): 110        Ultrasound guided       1. Ultrasound was used to identify targeted nerve, plexus, vascular marker, or fascial plane and place a needle adjacent to it in real-time.       2. Ultrasound was used to visualize the spread of anesthetic in close proximity to the above referenced structure.       3. A permanent image is entered into the patient's record.       4. The visualized anatomic structures appeared normal.       5. There were no apparent abnormal pathologic findings.    Assessment/Narrative         The placement was negative for: blood aspirated, painful injection and site bleeding       Paresthesias: No.       Bolus given via needle. no blood aspirated via catheter.        Secured via.        Insertion/Infusion Method: Single Shot       Complications: none    Medication(s) Administered   Bupivacaine 0.25% PF (Infiltration) - Infiltration   20 mL - 4/1/2024 10:56:00 AM  Bupivacaine liposome (Exparel) 1.3% LA inj susp (Infiltration) - Infiltration   20 mL  "- 4/1/2024 10:56:00 AM  Medication Administration Time: 4/1/2024 10:56 AM     Comments:  Bilateral TAP Block      FOR [facility] ONLY:   Pain Team Contact information: please page the Pain Team Via [employer]. Search \"Pain\". During daytime hours, please page the attending first. At night please page the resident first.      "

## 2024-04-01 NOTE — OP NOTE
[facility] Colorectal Surgery Operative Report  April 1, 2024    PREOPERATIVE DIAGNOSIS:  1. History of sigmoid cancer    2. Peritoneal carcinomatosis   3. Parastomal hernia    POSTOPERATIVE DIAGNOSIS:   1. History of sigmoid cancer     2. Peritoneal carcinomatosis (PCI 2)  3. Parastomal hernia    PROCEDURE:  1. Exploratory laparotomy  2. Omentectomy  3. Excision of falciform ligament  4. Cytoreduction with excision of left pelvis peritoneal nodule (4 cm)  5. Splenic flexure mobilization  6. Colostomy take down  7. Intra-op flexible sigmoidoscopy  8. Repair of parastomal hernia  9. Cystoscopy and stents (Dr Dewitt)    ANESTHESIA: General endotracheal anesthesia plus local anesthesia.    SURGEON:  Kendrick Hernandez M.D.    ASSISTANT(S): Sudhir Cruz, gyn/onc Fellow    INDICATIONS FOR PROCEDURE  Rachell Stacy is a 33 year old female who presented s/p amos's procedure for sigmoid adenocarcinoma, now with a large peritoneal nodule. I thoroughly discussed the risks, benefits, and alternatives of operative treatment with the patient and she agreed to proceed.    General risks related to abdominal surgery were reviewed with the patient. These include, but are not limited to, death, myocardial infarction, pneumonia, urinary tract infection, deep venous thrombosis with or without pulmonary embolus, abdominal infection from bowel injury or abscess, fistula, anastomotic leak that may require reoperation and a stoma, ureteral injury, bowel obstruction, wound infection, and bleeding.    OPERATIVE PROCEDURE:  After obtaining informed consent, the patient was brought to the operating room and placed in the supine position. Appropriate preoperative mechanical and chemical deep venous thrombosis prophylaxis, as well as preoperative prophylactic parenteral antibiotics were given. General endotracheal anesthesia was gently induced. Bilateral lower extremity pneumatic compression devices were applied and all pressure points were cushioned.  "The rectum was irrigated.     Urology performed a cystoscopy and stents, please see their notes for further details.    The abdomen was then preped and draped in the standard sterile fashion. The colostomy was closed with a pursestring suture and covered with a 4x4, tegaderm, and ioban. After a \"time-out\" was performed, the peritoneal cavity was entered through a sharp vertical midline incision.  Minimal  adhesions were encountered.      The mesentery of the colostomy was divided at the level of the peritoneal aperture with the ligasure device. A stapler with a blue load was used to  transect the colon. The falciform ligament  was divided with the ligasure device. An juan daniel wound protector was inserted and a mechanical retractor set up. A few adhesions between loops of bowel were divided sharply.    The omentum was dissected off the transverse colon and excised below the gastroepiploic arteries  using  the ligasure. It was passed off for pathologic examination. Adhesions between tthe back wall of the stomach and the transverse mesocolon were taken down with electrocautery. The splenic flexure was freed from its lateral attachments in order to gain adequate reach.    Next, the small bowel was packed out of the pelvis. A clear  nodule in the left paracolic gutted at the level of the  pelvic inlet was palpated. There was a loop of small bowel adherent to it. This was dissected sharply. The consequent small serosal tear  was repaired with interrupted 3-0 Vicryl sutures. The nodule (4 cm - PCI 2) was excised with electrocautery, making sure to protect the gonadal vessels and the ureters, which were easily palpated.    The rectal stump was dissected  from its pelvic attachments and minimal TME dissection was performed. Sizers were inserted with some difficulties. The staple line of  the descending  colon was removed with electrocautery. The anvil of a 29 EEA stapler was secured with a 2-0 prolene  suture. The stapler was " inserted in the rectum and the post  delivered through one of the corners of the rectal stump. The anvil was paired and the stapler closed and fired. Two intact anastomotic rings were obtained and passed off for path exam.    A flexible sigmoidoscopy was performed confirming an intact and dry anastomosis. The air leak test was negative. The pelvis was the irrigated with 1L of fluids. A 19 Fr  Mario drain was left in the pelvis and secured in the RLQ with a 2-0 Nylon suture. The fascia was closed with running 0 PDS. The subQ was irrigated and a small penrose was left  above the fascia. Carey's facsia was closed with running 2-0 Vicryl suture. The skin was closed with skin stapler and sterile dressings were applied.    We then focused on the colostomy. The mucocutaneous junction was disconnected with electrocautery and the stump extracted. There was a large hernia sac that was completely excised with electrocautery. The fascia was closed with figure of 0- PDS sutures. The skin was closed with a 2-0 Monocryl pursestring and packed with nu gauze.    The patient was extubated and brought to PACU in stable conditions. They tolerated the procedure well without immediate complications. I was scrubbed for all critical components of the operation. All sponge and needle counts were correct x 2 at the end of the procedure.      COMPLICATIONS: none.    ESTIMATED BLOOD LOSS: 1000mL.    SPECIMEN(S): colostomy, peritoneal nodule, anastomotic rings, falciform ligament, omentum.    OPERATIVE COUNT: Complete.    OPERATIVE FINDINGS:   Single 4 cm peritoneal nodule in the LLQ, PCI 2  End to end colorectal anastomosis at  15  cm, above the 3rd rectal valve    Kendrick Hernandez MD    Division of Colon & Rectal Surgery  Department of Surgery  [facility]  [phone]

## 2024-04-01 NOTE — PROGRESS NOTES
"Colorectal Surgery Post Op Check  4/1/2024    Rachell Stacy is a 33 year old female with h/o sigmoid cancer with peritoneal carcinomatosis and parastomal hernia now POD#0 s/p ex-lap, omentectomy, cytoreduction, colostomy take down, and repair of parastomal hernia.    Pt reports pain is well controlled on current regimen. Denies any fevers, chills, lightheadedness, dizziness, chest pain, shortness of breath, nausea, vomiting. Not passing flatus. No BM. Voiding via verdin catheter. No other concerns at this time.     BP (!) 140/95   Pulse 80   Temp 97.7  F (36.5  C) (Oral)   Resp 19   Ht 1.753 m (5' 9\")   Wt 124 kg (273 lb 5.9 oz)   SpO2 100%   BMI 40.37 kg/m    Body mass index is 40.37 kg/m .    Gen: A&O x3, NAD  Chest: NLB on RA  Abdomen: soft, appropriately tender, non-distended. RLQ drain with s/s output  Incision: clean, intact. Dressing with s/s strikethrough, outlined.   Extremities: warm and well perfused    Postoperative UOP: 125mL in PACU + 150mL in verdin bag = 275mL, adequate    Postop Labs Reviewed:  Hgb 10.9  CBC, BMP otherwise unremarkable      A/P: No acute post-op issues. Continue plan of care per primary team.       Thong Villar MD  General Surgery, PGY-1  [phone]    "

## 2024-04-01 NOTE — OR NURSING
Time out in pre-procedure done in 3C with pt and nurse Deny RICHARDSON Pt identifiers and procedure verified with patient and staff. Pre-op nurse noticed that no surgeon consent on paper and asked to have surgeon come down to pre-op to sign paper consent. Surgeon came to pt room and discussed the procedure with pt and proceeded to sign consent. Doubled checked consent after intubation and noticed that pt did not sign the consent. Pre-op nurse and OR charge nurse notified.

## 2024-04-02 ENCOUNTER — APPOINTMENT (OUTPATIENT)
Dept: OCCUPATIONAL THERAPY | Facility: CLINIC | Age: 34
End: 2024-04-02
Attending: SURGERY
Payer: COMMERCIAL

## 2024-04-02 LAB
ANION GAP SERPL CALCULATED.3IONS-SCNC: 11 MMOL/L (ref 7–15)
BUN SERPL-MCNC: 8 MG/DL (ref 6–20)
CALCIUM SERPL-MCNC: 8.9 MG/DL (ref 8.6–10)
CHLORIDE SERPL-SCNC: 104 MMOL/L (ref 98–107)
CREAT SERPL-MCNC: 0.69 MG/DL (ref 0.51–0.95)
DEPRECATED HCO3 PLAS-SCNC: 22 MMOL/L (ref 22–29)
EGFRCR SERPLBLD CKD-EPI 2021: >90 ML/MIN/1.73M2
ERYTHROCYTE [DISTWIDTH] IN BLOOD BY AUTOMATED COUNT: 16.6 % (ref 10–15)
GLUCOSE SERPL-MCNC: 108 MG/DL (ref 70–99)
HCT VFR BLD AUTO: 33.3 % (ref 35–47)
HGB BLD-MCNC: 10.4 G/DL (ref 11.7–15.7)
MAGNESIUM SERPL-MCNC: 2 MG/DL (ref 1.7–2.3)
MCH RBC QN AUTO: 29.8 PG (ref 26.5–33)
MCHC RBC AUTO-ENTMCNC: 31.2 G/DL (ref 31.5–36.5)
MCV RBC AUTO: 95 FL (ref 78–100)
PHOSPHATE SERPL-MCNC: 4.3 MG/DL (ref 2.5–4.5)
PLATELET # BLD AUTO: 277 10E3/UL (ref 150–450)
POTASSIUM SERPL-SCNC: 4.2 MMOL/L (ref 3.4–5.3)
RBC # BLD AUTO: 3.49 10E6/UL (ref 3.8–5.2)
SODIUM SERPL-SCNC: 137 MMOL/L (ref 135–145)
WBC # BLD AUTO: 10 10E3/UL (ref 4–11)

## 2024-04-02 PROCEDURE — 97535 SELF CARE MNGMENT TRAINING: CPT | Mod: GO

## 2024-04-02 PROCEDURE — 250N000011 HC RX IP 250 OP 636: Performed by: SURGERY

## 2024-04-02 PROCEDURE — 83735 ASSAY OF MAGNESIUM: CPT | Performed by: SURGERY

## 2024-04-02 PROCEDURE — 97165 OT EVAL LOW COMPLEX 30 MIN: CPT | Mod: GO

## 2024-04-02 PROCEDURE — 80048 BASIC METABOLIC PNL TOTAL CA: CPT | Performed by: SURGERY

## 2024-04-02 PROCEDURE — 999N000147 HC STATISTIC PT IP EVAL DEFER

## 2024-04-02 PROCEDURE — 258N000003 HC RX IP 258 OP 636: Performed by: PHYSICIAN ASSISTANT

## 2024-04-02 PROCEDURE — 84100 ASSAY OF PHOSPHORUS: CPT | Performed by: SURGERY

## 2024-04-02 PROCEDURE — 36415 COLL VENOUS BLD VENIPUNCTURE: CPT | Performed by: SURGERY

## 2024-04-02 PROCEDURE — 120N000002 HC R&B MED SURG/OB UMMC

## 2024-04-02 PROCEDURE — 97530 THERAPEUTIC ACTIVITIES: CPT | Mod: GO

## 2024-04-02 PROCEDURE — 250N000013 HC RX MED GY IP 250 OP 250 PS 637: Performed by: SURGERY

## 2024-04-02 PROCEDURE — 85014 HEMATOCRIT: CPT | Performed by: SURGERY

## 2024-04-02 RX ORDER — SODIUM CHLORIDE, SODIUM LACTATE, POTASSIUM CHLORIDE, CALCIUM CHLORIDE 600; 310; 30; 20 MG/100ML; MG/100ML; MG/100ML; MG/100ML
INJECTION, SOLUTION INTRAVENOUS CONTINUOUS
Status: CANCELLED | OUTPATIENT
Start: 2024-04-02

## 2024-04-02 RX ORDER — ENOXAPARIN SODIUM 100 MG/ML
40 INJECTION SUBCUTANEOUS EVERY 24 HOURS
Status: DISCONTINUED | OUTPATIENT
Start: 2024-04-02 | End: 2024-04-03

## 2024-04-02 RX ADMIN — ACETAMINOPHEN 1000 MG: 500 TABLET ORAL at 03:22

## 2024-04-02 RX ADMIN — ACETAMINOPHEN 1000 MG: 500 TABLET ORAL at 09:20

## 2024-04-02 RX ADMIN — ENOXAPARIN SODIUM 40 MG: 40 INJECTION SUBCUTANEOUS at 10:03

## 2024-04-02 RX ADMIN — ACETAMINOPHEN 1000 MG: 500 TABLET ORAL at 21:21

## 2024-04-02 RX ADMIN — ONDANSETRON 4 MG: 2 INJECTION INTRAMUSCULAR; INTRAVENOUS at 20:05

## 2024-04-02 RX ADMIN — ACETAMINOPHEN 1000 MG: 500 TABLET ORAL at 14:38

## 2024-04-02 RX ADMIN — SODIUM CHLORIDE, POTASSIUM CHLORIDE, SODIUM LACTATE AND CALCIUM CHLORIDE: 600; 310; 30; 20 INJECTION, SOLUTION INTRAVENOUS at 12:48

## 2024-04-02 ASSESSMENT — ACTIVITIES OF DAILY LIVING (ADL)
ADLS_ACUITY_SCORE: 19
PREVIOUS_RESPONSIBILITIES: MEAL PREP;HOUSEKEEPING;LAUNDRY;SHOPPING;YARDWORK;MEDICATION MANAGEMENT;FINANCES;DRIVING;WORK
ADLS_ACUITY_SCORE: 19
ADLS_ACUITY_SCORE: 23

## 2024-04-02 NOTE — PLAN OF CARE
Goal Outcome Evaluation:      Plan of Care Reviewed With: patient                   Activity: Not OOB, pt stated waiting until today to dangle feed  Neuro: WDL, A&Ox4  Cardiac: WDL  Respiratory: WDL, on 2L NC, cont capno in place.  GI/: Verdin in place with 400mL of output overnight.  Diet: Clear liquid  Skin: Midline abd incision, colostomy take down site.  Lines/Drains: AIMEE with 10mL output, verdin  Pain/Nausea: 5/10 abd pain managed with PCA dilaudid 0.2mg and scheduled tylenol. Denies nausea.  Changes:

## 2024-04-02 NOTE — PROGRESS NOTES
Admitted/transferred from:     2 RN full  skin assessment completed by Payton Sutherland, RN and Ifeoma Adkins RN.     Skin assessment finding:   Interventions/actions: overall skin in CDI w/ midline incision that is clean, intact, dressing w/ drainage, outlined, x2 PIV     Will continue to monitor.

## 2024-04-02 NOTE — PROGRESS NOTES
Colorectal Surgery Progress Note  [facility]  POD#1      Subjective:  doing ok.  Pain relatively controlled.  No nausea.  Tolerating small amount of CLD.  No gas/stool.      Vitals:  Vitals:    04/01/24 1813 04/01/24 1845 04/01/24 2346 04/02/24 0347   BP: 130/77 114/69 124/84 120/76   BP Location: Right leg Right arm Right arm Right arm   Pulse: 80 71 72 68   Resp: 11 13 14 11   Temp: 97.7  F (36.5  C) 98.2  F (36.8  C) 98.3  F (36.8  C) 98.2  F (36.8  C)   TempSrc: Oral Oral Oral Oral   SpO2: 96% 96% 96% 93%   Weight:       Height:         I/O:  I/O last 3 completed shifts:  In: 1441 [P.O.:420; I.V.:1021]  Out: 1285 [Urine:925; Drains:260; Blood:100]    Physical Exam:  Gen: AAOx3, NAD  Pulm: Non-labored breathing  Abd: Soft, mildly distended, appropriately tender, no guarding/rebound   Incision C/D/I with staples in place.  Small penrose drain at the inferior aspect of the midline incision.  Penrose drain is above the fascia - with serosang drainage.    Take down site with serosang drainage.  Packing removed and re-packed   AIMEE drain serosang  Ext:  Warm and well-perfused    BMP  Recent Labs   Lab 04/02/24 0421 04/01/24  0801 03/28/24  1602     --  139   POTASSIUM 4.2  --  4.3   CHLORIDE 104  --  104   CO2 22  --  21*   BUN 8.0  --  14.8   CR 0.69  --  0.75   * 102* 99   MAG 2.0  --   --    PHOS 4.3  --   --      CBC  Recent Labs   Lab 04/02/24 0421 04/01/24  1944 03/28/24  1602   WBC 10.0  --  8.4   HGB 10.4*  --  10.9*   HCT 33.3*  --  34.3*    273 258         ASSESSMENT: This is a 33 year old female with history of sigmoid cancer s/p prior Hartmanns procedure more recently found to have a large peritoneal nodule of recurrence and known parastomal hernia.  Now s/p Ex Lap, omentectomy, excision of falciform ligament, cytoreduction w/ excision of left pelvis peritoneal nodule, splenic flex mobilizaton, colostomy take down, flex sig, repair of parastomal hernia on  "4/1.      Neuro/Pain: dilaudid PCA, tylenol  CV:  no acute concerns at this time  PULM: encourage IS.  GI/FEN:   - FLD this morning  - IVF @ 50.  If tolerates liquids well today, likely can turn off IVF later this afternoon  - old ostomy site wound - pack 1-2 times daily and more as needed while inpatient  - replete electrolytes  - ambulate  : remove verdin today  Heme: Hgb 10.4 from 10.9.  monitor.  OK to start lovenox ppx.   ID: no acute concerns at this time  Endocrine:  no acute concerns at this time    Activity: as tolerated.  Ppx: lovenox ppx.    Dispo: Discharge 1-2 days pending ROBF.  will NOT need lovenox on discharge.  AIMEE drain - TBD pending outputs but will try to remove prior to discharge.  Penrose drain - TBD pending outputs but likely will remove prior to discharge.      Clinically Significant Risk Factors                         # Severe Obesity: Estimated body mass index is 40.37 kg/m  as calculated from the following:    Height as of this encounter: 1.753 m (5' 9\").    Weight as of this encounter: 124 kg (273 lb 5.9 oz)., PRESENT ON ADMISSION              Enedina Garzon PA-C ..................4/2/2024   8:47 AM  Colon and Rectal Surgery    Patient was seen and discussed with Dr. Caballero    The above plan of care was performed and communicated to me by Dr. Hernandez    I spent 25 minute face-to-face or coordinating care of Rachell Stacy. Over 50% of our time on the unit was spent counseling the patient and/or coordinating care as documented in the assessment and plan.     [id number] post op hospital visit    "

## 2024-04-02 NOTE — PROGRESS NOTES
Physical Therapy: Orders received and appreciated. Chart reviewed and discussed with care team.? Physical Therapy not indicated due to lack of mobility deficits. Per OT, pt ambulating 400' with up to SBA. Acute OT will continue to follow for stair safety.? Defer discharge recommendations to OT.? Will complete orders.

## 2024-04-02 NOTE — PLAN OF CARE
Goal Outcome Evaluation:    POD 0  A&O x 4  Afebrile, OVSS on RA, on Capno   C/o of abdominal/incisional pain, pain was managed w/ PCA pump and scheduled tylenol   Denies n/v, increased SOB and chest pain   Midline abdominal incision is clean, intact, dressing w/ drainage, that is outline, and dressing is reinforced, RLQ AIMEE drain w/ good output  Tolerating clear liquids w/ fair appetite, did not order food during shift  Perry catheter is in place w/ good UOP  - flatus, no BM during shift   Did not want to get out of bed during shift, and said he would try tomorrow  x 2 PIV, R SL, and L infusing

## 2024-04-02 NOTE — PROGRESS NOTES
"   Occupational Therapy Evaluation: 04/02/24 0820   Appointment Info   Signing Clinician's Name / Credentials (OT) Natalie Rios, OTD, OTR/L   Rehab Comments (OT) Ab Precautions, OT Only   Living Environment   People in Home parent(s);child(jimmy), adult   Current Living Arrangements house   Home Accessibility stairs to enter home;stairs within home   Number of Stairs, Main Entrance 3   Stair Railings, Main Entrance railings safe and in good condition;railings on both sides of stairs   Number of Stairs, Within Home, Primary greater than 10 stairs   Stair Railings, Within Home, Primary railing on right side (ascending)   Transportation Anticipated car, drives self;family or friend will provide   Living Environment Comments Pt reports living with parents and adult sister in a house. Pt bathroom and bedroom are located upstairs. Tub shower in bathroom with grab bars.   Self-Care   Usual Activity Tolerance good   Current Activity Tolerance moderate   Equipment Currently Used at Home shower chair;grab bar, tub/shower;other (see comments)  (Has shower chair at home but was not using day to day)   Fall history within last six months yes   Number of times patient has fallen within last six months 1  (Pt reports slipping in the snow)   Activity/Exercise/Self-Care Comment Pt reports ind with ADLs at baseline.   Instrumental Activities of Daily Living (IADL)   Previous Responsibilities meal prep;housekeeping;laundry;shopping;yardwork;medication management;finances;driving;work   IADL Comments Pt reports ind with IADLs at baseline, works as a .   General Information   Onset of Illness/Injury or Date of Surgery 04/01/24   Referring Physician Kendrick Hernandez MD   Patient/Family Therapy Goal Statement (OT) Return home   Additional Occupational Profile Info/Pertinent History of Current Problem \"This is a 33 year old female with history of sigmoid cancer s/p prior Hartmanns procedure more recently found to have a " "large peritoneal nodule of recurrence and known parastomal hernia.  Now s/p Ex Lap, omentectomy, excision of falciform ligament, cytoreduction w/ excision of left pelvis peritoneal nodule, splenic flex mobilizaton, colostomy take down, flex sig, repair of parastomal hernia on 4/1.\"   Existing Precautions/Restrictions abdominal;fall   Left Upper Extremity (Weight-bearing Status) partial weight-bearing (PWB)  (<10lb weight restriction)   Right Upper Extremity (Weight-bearing Status) partial weight-bearing (PWB)  (<10lb weight restriction)   Left Lower Extremity (Weight-bearing Status) full weight-bearing (FWB)   Right Lower Extremity (Weight-bearing Status) full weight-bearing (FWB)   General Observations and Info Activity: ambulate with assist   Cognitive Status Examination   Orientation Status orientation to person, place and time   Visual Perception   Visual Impairment/Limitations WFL   Sensory   Sensory Comments Pt reports neuropathy bilateral hands/feet.   Pain Assessment   Patient Currently in Pain Yes, see Vital Sign flowsheet   Posture   Posture not impaired   Range of Motion Comprehensive   General Range of Motion bilateral upper extremity ROM WNL   Strength Comprehensive (MMT)   Comment, General Manual Muscle Testing (MMT) Assessment Not formally assessed due to ab precautions.   Coordination   Upper Extremity Coordination No deficits were identified   Bed Mobility   Bed Mobility supine-sit   Comment (Bed Mobility) CGA   Transfers   Transfers toilet transfer   Transfer Comments SBA per clinical judgement   Activities of Daily Living   BADL Assessment/Intervention bathing;lower body dressing;grooming;toileting   Bathing Assessment/Intervention   [place] (Bathing) moderate assist (50% patient effort)   Comment, (Bathing) Per clinical judgement   Lower Body Dressing Assessment/Training   Comment, (Lower Body Dressing) Per clinical judgement   [place] (Lower Body Dressing) moderate assist " (50% patient effort)   Grooming Assessment/Training   [place] Level (Grooming) contact guard assist   Comment, (Grooming) Per clinical judgement   Toileting   Comment, (Toileting) Per clinical judgement   [place] Level (Toileting) contact guard assist   Clinical Impression   Criteria for Skilled Therapeutic Interventions Met (OT) Yes, treatment indicated   OT Diagnosis Decreased ind with ADL/IADLs and activity tolerance   OT Problem List-Impairments impacting ADL problems related to;activity tolerance impaired;mobility;pain;post-surgical precautions   Assessment of Occupational Performance 3-5 Performance Deficits   Identified Performance Deficits decreased activity tolerance, bathing, LB dressing, g/h, toileting   Planned Therapy Interventions (OT) ADL retraining;IADL retraining;bed mobility training;home program guidelines;progressive activity/exercise;risk factor education   Clinical Decision Making Complexity (OT) problem focused assessment/low complexity   Risk & Benefits of therapy have been explained evaluation/treatment results reviewed;care plan/treatment goals reviewed;risks/benefits reviewed;current/potential barriers reviewed;participants voiced agreement with care plan;participants included;patient;father   Clinical Impression Comments Pt would benefit from skilled IP OT services to increase ind and safety with ADL/IADLs and mobility while IP.   OT Total Evaluation Time   OT Eval, Low Complexity Minutes (71131) 5   OT Goals   Therapy Frequency (OT) Daily   OT Predicted Duration/Target Date for Goal Attainment 04/16/24   OT Goals Hygiene/Grooming;Lower Body Dressing;Upper Body Dressing;Bed Mobility;Meal Preparation;Home Management;Aerobic Activity;OT Goal 1;OT Goal 2;Toilet Transfer/Toileting   OT: Hygiene/Grooming modified independent   OT: Upper Body Dressing Modified independent   OT: Lower Body Dressing Modified independent   OT: Bed Mobility Modified independent   OT: Toilet  Transfer/Toileting Modified independent   OT: Meal Preparation Modified independent   OT: Home Management Modified independent   OT: Perform aerobic activity with stable cardiovascular response continuous activity;10 minutes;ambulation   OT: Goal 1 Pt will complete 13 steps ind prior to discharge from IP to increase safety with household mobility upon discharge.   OT: Goal 2 Pt will demo safe lateral tub transsfer to increase ind and safety with bathing ADLs upon discharge from IP.   OT Discharge Planning   OT Plan Stairs, tub transfer, toilet transfer   OT Discharge Recommendation (DC Rec) home with assist   OT Rationale for DC Rec Anticipate when pt medically stable, would be safe to discharge home with assist from family as needed with heavier IADLs. Pt primarily limited by decreased activity tolerance and increased pain this date.   OT Brief overview of current status SBA

## 2024-04-02 NOTE — PLAN OF CARE
Goal Outcome Evaluation:    A&Ox4. VSS. On RA. Perry removed this morning and Pt voiding spontaneously with good UOP. Tolerating diet. Denies nausea. Abdomen pain controlled with dilaudid pca. Midline abdomen incision with staples. Right abdomen AIMEE, left old colostomy site and penrose drain at the bottom of midline incision with small serosang drainage. Dressing changed x1. Left arm PIV infusing LR at 50ml/hr. Up with 1 assist.

## 2024-04-03 ENCOUNTER — APPOINTMENT (OUTPATIENT)
Dept: OCCUPATIONAL THERAPY | Facility: CLINIC | Age: 34
End: 2024-04-03
Attending: UROLOGY
Payer: COMMERCIAL

## 2024-04-03 LAB
ANION GAP SERPL CALCULATED.3IONS-SCNC: 11 MMOL/L (ref 7–15)
BUN SERPL-MCNC: 7.1 MG/DL (ref 6–20)
CALCIUM SERPL-MCNC: 9.1 MG/DL (ref 8.6–10)
CHLORIDE SERPL-SCNC: 102 MMOL/L (ref 98–107)
CREAT SERPL-MCNC: 0.77 MG/DL (ref 0.51–0.95)
DEPRECATED HCO3 PLAS-SCNC: 25 MMOL/L (ref 22–29)
EGFRCR SERPLBLD CKD-EPI 2021: >90 ML/MIN/1.73M2
ERYTHROCYTE [DISTWIDTH] IN BLOOD BY AUTOMATED COUNT: 16.2 % (ref 10–15)
GLUCOSE SERPL-MCNC: 89 MG/DL (ref 70–99)
HCT VFR BLD AUTO: 31.7 % (ref 35–47)
HGB BLD-MCNC: 10.1 G/DL (ref 11.7–15.7)
MAGNESIUM SERPL-MCNC: 1.9 MG/DL (ref 1.7–2.3)
MCH RBC QN AUTO: 30.9 PG (ref 26.5–33)
MCHC RBC AUTO-ENTMCNC: 31.9 G/DL (ref 31.5–36.5)
MCV RBC AUTO: 97 FL (ref 78–100)
PHOSPHATE SERPL-MCNC: 3.6 MG/DL (ref 2.5–4.5)
PLATELET # BLD AUTO: 249 10E3/UL (ref 150–450)
POTASSIUM SERPL-SCNC: 3.9 MMOL/L (ref 3.4–5.3)
RBC # BLD AUTO: 3.27 10E6/UL (ref 3.8–5.2)
SODIUM SERPL-SCNC: 138 MMOL/L (ref 135–145)
WBC # BLD AUTO: 9.2 10E3/UL (ref 4–11)

## 2024-04-03 PROCEDURE — 36415 COLL VENOUS BLD VENIPUNCTURE: CPT | Performed by: PHYSICIAN ASSISTANT

## 2024-04-03 PROCEDURE — 83735 ASSAY OF MAGNESIUM: CPT | Performed by: PHYSICIAN ASSISTANT

## 2024-04-03 PROCEDURE — 250N000013 HC RX MED GY IP 250 OP 250 PS 637: Performed by: SURGERY

## 2024-04-03 PROCEDURE — 97530 THERAPEUTIC ACTIVITIES: CPT | Mod: GO

## 2024-04-03 PROCEDURE — 85027 COMPLETE CBC AUTOMATED: CPT | Performed by: PHYSICIAN ASSISTANT

## 2024-04-03 PROCEDURE — 250N000011 HC RX IP 250 OP 636: Performed by: SURGERY

## 2024-04-03 PROCEDURE — 120N000002 HC R&B MED SURG/OB UMMC

## 2024-04-03 PROCEDURE — 84100 ASSAY OF PHOSPHORUS: CPT | Performed by: PHYSICIAN ASSISTANT

## 2024-04-03 PROCEDURE — 80048 BASIC METABOLIC PNL TOTAL CA: CPT | Performed by: PHYSICIAN ASSISTANT

## 2024-04-03 PROCEDURE — 97535 SELF CARE MNGMENT TRAINING: CPT | Mod: GO

## 2024-04-03 RX ORDER — OXYCODONE HYDROCHLORIDE 10 MG/1
10 TABLET ORAL EVERY 4 HOURS PRN
Status: DISCONTINUED | OUTPATIENT
Start: 2024-04-03 | End: 2024-04-04 | Stop reason: HOSPADM

## 2024-04-03 RX ORDER — OXYCODONE HYDROCHLORIDE 5 MG/1
5 TABLET ORAL EVERY 4 HOURS PRN
Status: DISCONTINUED | OUTPATIENT
Start: 2024-04-03 | End: 2024-04-04 | Stop reason: HOSPADM

## 2024-04-03 RX ADMIN — ENOXAPARIN SODIUM 40 MG: 40 INJECTION SUBCUTANEOUS at 08:13

## 2024-04-03 RX ADMIN — ACETAMINOPHEN 1000 MG: 500 TABLET ORAL at 14:36

## 2024-04-03 RX ADMIN — ACETAMINOPHEN 1000 MG: 500 TABLET ORAL at 01:59

## 2024-04-03 RX ADMIN — ONDANSETRON 4 MG: 2 INJECTION INTRAMUSCULAR; INTRAVENOUS at 12:02

## 2024-04-03 RX ADMIN — ACETAMINOPHEN 1000 MG: 500 TABLET ORAL at 08:12

## 2024-04-03 RX ADMIN — ACETAMINOPHEN 1000 MG: 500 TABLET ORAL at 21:30

## 2024-04-03 ASSESSMENT — ACTIVITIES OF DAILY LIVING (ADL)
ADLS_ACUITY_SCORE: 19
ADLS_ACUITY_SCORE: 23
ADLS_ACUITY_SCORE: 19
ADLS_ACUITY_SCORE: 23
ADLS_ACUITY_SCORE: 19
ADLS_ACUITY_SCORE: 23
ADLS_ACUITY_SCORE: 19
ADLS_ACUITY_SCORE: 23
ADLS_ACUITY_SCORE: 23
ADLS_ACUITY_SCORE: 19
ADLS_ACUITY_SCORE: 23
ADLS_ACUITY_SCORE: 19
ADLS_ACUITY_SCORE: 23
ADLS_ACUITY_SCORE: 23

## 2024-04-03 NOTE — PROGRESS NOTES
Colorectal Surgery Progress Note  [facility]  POD#2      Subjective:  No acute events overnight. Patient is doing well. Requiring dosage of PCA every hour in addition to scheduled Tylenol for adequate pain control overnight. Not currently experiencing pain. Has started passing gas. No BM yet. Tolerating full liquid diet. One episode of dizziness last night while walking around unit. Vomited dinner of tomato soup following return to room. Dizziness resolved after a few minutes. No episodes of vomiting, nausea or dizziness since event. Denies chest pain, SOB, fevers, or chills. Spontaneously voided 3x since verdin removal this morning. No questions or concerns.    Vitals:  Vitals:    04/03/24 0153 04/03/24 0500 04/03/24 0502 04/03/24 0503   BP: (!) 130/96   120/83   BP Location: Right arm   Right arm   Pulse:  88     Resp: 16 16     Temp: 98.7  F (37.1  C) 98.5  F (36.9  C)     TempSrc: Oral Oral     SpO2: 95% 90% 93% 94%   Weight:       Height:         I/O:  I/O last 3 completed shifts:  In: 2252.42 [P.O.:680; I.V.:1572.42]  Out: 2120 [Urine:2050; Drains:70]    Physical Exam:  Gen: AAOx3, NAD, appears well  Pulm: Lungs clear to auscultation, non-labored breathing  CV:       Normal S1 and S2, normal rate & rhythm  Abd: Soft, mildly distended, appropriately tender, no guarding, active bowel sounds   Incision C/D/I with staples in place. Small penrose near midline incision. AIMEE drain with serosanguineous drainage.  Ext:  Warm and well-perfused    BMP  Recent Labs   Lab 04/02/24 0421 04/01/24  0801 03/28/24  1602     --  139   POTASSIUM 4.2  --  4.3   CHLORIDE 104  --  104   CO2 22  --  21*   BUN 8.0  --  14.8   CR 0.69  --  0.75   * 102* 99   MAG 2.0  --   --    PHOS 4.3  --   --      CBC  Recent Labs   Lab 04/02/24 0421 04/01/24  1944 03/28/24  1602   WBC 10.0  --  8.4   HGB 10.4*  --  10.9*   HCT 33.3*  --  34.3*    273 258         ASSESSMENT: This is a 33 year old  "female with history of sigmoid cancer s/p previous Christine's procedure, recently found to have peritoneal carcinomatosis of recurrence and known parastomal hernia. Now s/p exploratory laparotomy, omentectomy, excision of falciform ligament, cytoreduction with excision of left pelvis peritoneal nodule, splenic flexure mobilization, colostomy take down, flex sigmoidoscopy and repair of parastomal hernia on 4/1. Currently stable and progressing appropriately.      Neuro/Pain: dilaudid PCA, scheduled Tylenol  CV: no acute concerns at this time  PULM: encourage incentive spirometry  GI/FEN:   -Full liquid diet  -Discontinue 50 ml/hr IVF given tolerance of full liquid diet  -Ondansetron q6hr prn for nausea  : no acute concerns, verdin removed this morning, voiding spontaneously x 3  Heme: no acute concerns, hgb stable on recent labs  ID: no acute concerns  Endocrine: no acute concerns  Lines: AIMEE drain RLQ, Anterior penrose drain - likely okay to remove both prior to discharge pending outputs  Activity: as tolerated.  Ppx: Continue Lovenox prophylaxis, okay to discontinue at discharge  Dispo: Discharge tomorrow pending return of full bowel function    Clinically Significant Risk Factors                         # Severe Obesity: Estimated body mass index is 40.05 kg/m  as calculated from the following:    Height as of this encounter: 1.753 m (5' 9\").    Weight as of this encounter: 123 kg (271 lb 3.2 oz)., PRESENT ON ADMISSION            Yuliya Tatum, MS3        Addendum:  Pt was seen and examined independent of the medical student.  Agree with note above.     O:  Vitals, I&Os reviewed  NAD  Norm resp effort  abd minimally distended.    Remainder of exam as above.     A/P:    - advance to regular diet  - if tolerate reg diet, will stop PCA later today and change to oral pain control  Dispo:  possible discharge tomorrow with dressing changes to old ostomy site.  No lovenox on discharge.  Tentatively plan to remove AIMEE " drain and penrose drain.     Enedina Garzon PA-C  Colon and Rectal Surgery     Seen and discussed with Dr. Caballero    The above plan of care was performed and communicated to me by Dr. Hernandez    I spent 25 minute face-to-face or coordinating care of Rachell Stacy. Over 50% of our time on the unit was spent counseling the patient and/or coordinating care as documented in the assessment and plan.     83991 post op hospital visit

## 2024-04-03 NOTE — PLAN OF CARE
Goal Outcome Evaluation:    VSS. Ambulating long distance in [place]andez with SBA 1. Dressing intact with shadowed drainage. Tolerating liquids with slight nausea. +BS and reports flatus x 1. Using IS well.  1 episode of bloody rectal mucus. Voiding without difficulty. Minimal use of PCA and will discontinue and start oxycodone.

## 2024-04-04 ENCOUNTER — APPOINTMENT (OUTPATIENT)
Dept: OCCUPATIONAL THERAPY | Facility: CLINIC | Age: 34
End: 2024-04-04
Attending: UROLOGY
Payer: COMMERCIAL

## 2024-04-04 VITALS
WEIGHT: 271.2 LBS | RESPIRATION RATE: 15 BRPM | OXYGEN SATURATION: 98 % | HEIGHT: 69 IN | BODY MASS INDEX: 40.17 KG/M2 | DIASTOLIC BLOOD PRESSURE: 88 MMHG | TEMPERATURE: 98.4 F | HEART RATE: 84 BPM | SYSTOLIC BLOOD PRESSURE: 117 MMHG

## 2024-04-04 LAB
ANION GAP SERPL CALCULATED.3IONS-SCNC: 12 MMOL/L (ref 7–15)
BUN SERPL-MCNC: 9.4 MG/DL (ref 6–20)
CALCIUM SERPL-MCNC: 9 MG/DL (ref 8.6–10)
CHLORIDE SERPL-SCNC: 105 MMOL/L (ref 98–107)
CREAT SERPL-MCNC: 0.63 MG/DL (ref 0.51–0.95)
DEPRECATED HCO3 PLAS-SCNC: 23 MMOL/L (ref 22–29)
EGFRCR SERPLBLD CKD-EPI 2021: >90 ML/MIN/1.73M2
ERYTHROCYTE [DISTWIDTH] IN BLOOD BY AUTOMATED COUNT: 16.1 % (ref 10–15)
GLUCOSE SERPL-MCNC: 90 MG/DL (ref 70–99)
HCT VFR BLD AUTO: 30.5 % (ref 35–47)
HGB BLD-MCNC: 9.8 G/DL (ref 11.7–15.7)
MAGNESIUM SERPL-MCNC: 1.9 MG/DL (ref 1.7–2.3)
MCH RBC QN AUTO: 30.7 PG (ref 26.5–33)
MCHC RBC AUTO-ENTMCNC: 32.1 G/DL (ref 31.5–36.5)
MCV RBC AUTO: 96 FL (ref 78–100)
PHOSPHATE SERPL-MCNC: 4.5 MG/DL (ref 2.5–4.5)
PLATELET # BLD AUTO: 270 10E3/UL (ref 150–450)
POTASSIUM SERPL-SCNC: 3.7 MMOL/L (ref 3.4–5.3)
RBC # BLD AUTO: 3.19 10E6/UL (ref 3.8–5.2)
SODIUM SERPL-SCNC: 140 MMOL/L (ref 135–145)
WBC # BLD AUTO: 9.1 10E3/UL (ref 4–11)

## 2024-04-04 PROCEDURE — 36415 COLL VENOUS BLD VENIPUNCTURE: CPT | Performed by: PHYSICIAN ASSISTANT

## 2024-04-04 PROCEDURE — 250N000013 HC RX MED GY IP 250 OP 250 PS 637

## 2024-04-04 PROCEDURE — 84100 ASSAY OF PHOSPHORUS: CPT | Performed by: PHYSICIAN ASSISTANT

## 2024-04-04 PROCEDURE — 85027 COMPLETE CBC AUTOMATED: CPT | Performed by: PHYSICIAN ASSISTANT

## 2024-04-04 PROCEDURE — 250N000013 HC RX MED GY IP 250 OP 250 PS 637: Performed by: SURGERY

## 2024-04-04 PROCEDURE — 80048 BASIC METABOLIC PNL TOTAL CA: CPT | Performed by: PHYSICIAN ASSISTANT

## 2024-04-04 PROCEDURE — 83735 ASSAY OF MAGNESIUM: CPT | Performed by: PHYSICIAN ASSISTANT

## 2024-04-04 PROCEDURE — 250N000013 HC RX MED GY IP 250 OP 250 PS 637: Performed by: PHYSICIAN ASSISTANT

## 2024-04-04 PROCEDURE — 250N000011 HC RX IP 250 OP 636: Performed by: SURGERY

## 2024-04-04 PROCEDURE — 97535 SELF CARE MNGMENT TRAINING: CPT | Mod: GO

## 2024-04-04 RX ORDER — POTASSIUM CHLORIDE 750 MG/1
40 TABLET, EXTENDED RELEASE ORAL ONCE
Status: COMPLETED | OUTPATIENT
Start: 2024-04-04 | End: 2024-04-04

## 2024-04-04 RX ORDER — MAGNESIUM SULFATE HEPTAHYDRATE 40 MG/ML
2 INJECTION, SOLUTION INTRAVENOUS ONCE
Status: COMPLETED | OUTPATIENT
Start: 2024-04-04 | End: 2024-04-04

## 2024-04-04 RX ORDER — OXYCODONE HYDROCHLORIDE 5 MG/1
5 TABLET ORAL EVERY 4 HOURS PRN
Qty: 5 TABLET | Refills: 0 | Status: SHIPPED | OUTPATIENT
Start: 2024-04-04

## 2024-04-04 RX ORDER — ACETAMINOPHEN 325 MG/1
975 TABLET ORAL ONCE
Status: COMPLETED | OUTPATIENT
Start: 2024-04-04 | End: 2024-04-04

## 2024-04-04 RX ORDER — MAGNESIUM OXIDE 400 MG/1
400 TABLET ORAL ONCE
Status: DISCONTINUED | OUTPATIENT
Start: 2024-04-04 | End: 2024-04-04

## 2024-04-04 RX ORDER — ACETAMINOPHEN 500 MG
1000 TABLET ORAL EVERY 6 HOURS
Qty: 24 TABLET | Refills: 0 | Status: SHIPPED | OUTPATIENT
Start: 2024-04-04

## 2024-04-04 RX ADMIN — POTASSIUM CHLORIDE 40 MEQ: 750 TABLET, EXTENDED RELEASE ORAL at 08:50

## 2024-04-04 RX ADMIN — ACETAMINOPHEN 1000 MG: 500 TABLET ORAL at 01:48

## 2024-04-04 RX ADMIN — MAGNESIUM SULFATE HEPTAHYDRATE 2 G: 2 INJECTION, SOLUTION INTRAVENOUS at 08:50

## 2024-04-04 RX ADMIN — ACETAMINOPHEN 1000 MG: 500 TABLET ORAL at 08:50

## 2024-04-04 RX ADMIN — OXYCODONE HYDROCHLORIDE 5 MG: 5 TABLET ORAL at 01:48

## 2024-04-04 RX ADMIN — ACETAMINOPHEN 975 MG: 325 TABLET, FILM COATED ORAL at 16:03

## 2024-04-04 ASSESSMENT — ACTIVITIES OF DAILY LIVING (ADL)
ADLS_ACUITY_SCORE: 18
ADLS_ACUITY_SCORE: 19
ADLS_ACUITY_SCORE: 18
ADLS_ACUITY_SCORE: 19
ADLS_ACUITY_SCORE: 18

## 2024-04-04 NOTE — PLAN OF CARE
Goal Outcome Evaluation:    VSS. UAL in halls. AIMEE removed and takedown dressing changed. Pain managed on tylenol. Tolerating diet well. Discharge orders in place.

## 2024-04-04 NOTE — PLAN OF CARE
Occupational Therapy Discharge Summary    Reason for therapy discharge:    All goals and outcomes met, no further needs identified.    Progress towards therapy goal(s). [facility] for goal details.  Goals met    Therapy recommendation(s):    No further therapy is recommended.Anticipate when pt medically stable, would be safe to discharge home with assist from family as needed with heavier IADLs. mod. I ADL/mobility

## 2024-04-04 NOTE — DISCHARGE SUMMARY
[facility]  Discharge Summary  Colon and Rectal Surgery     Rachell Stacy MRN# [id number]   YOB: 1990 Age: 33 year old     Date of Admission:  4/1/2024  Date of Discharge::  4/4/2024  Admitting Physician:  Kendrick Hernandez MD  Discharge Physician:  Kendrick Hernandez MD  Primary Care Physician:        Shanna Hassan          Admission Diagnoses:   Peritoneal carcinomatosis (H) [C78.6]  Malignant neoplasm of sigmoid colon (H) [C18.7]  S/p prior laparoscopic sigmoid colectomy with end colostomy and appendectomy.    Declined adjuvant therapy  Found to have recurrent disease          Discharge Diagnosis:   Peritoneal carcinomatosis (H) [C78.6]  Malignant neoplasm of sigmoid colon (H) [C18.7]  S/p prior laparoscopic sigmoid colectomy with end colostomy and appendectomy.    Declined adjuvant therapy  Found to have recurrent disease         Procedures:   4/1/2024:   PROCEDURE:  1. Exploratory laparotomy  2. Omentectomy  3. Excision of falciform ligament  4. Cytoreduction with excision of left pelvis peritoneal nodule (4 cm)  5. Splenic flexure mobilization  6. Colostomy take down  7. Intra-op flexible sigmoidoscopy  8. Repair of parastomal hernia  9. Cystoscopy and stents (Dr Dewitt)     ANESTHESIA: General endotracheal anesthesia plus local anesthesia.          Consultations:   OCCUPATIONAL THERAPY ADULT IP CONSULT  PHYSICAL THERAPY ADULT IP CONSULT         Imaging Studies:     Results for orders placed or performed during the hospital encounter of 04/01/24   POC US Guidance Needle Placement    Narrative    Bilateral TAP Block          Medications Prior to Admission:     Medications Prior to Admission   Medication Sig Dispense Refill Last Dose    [DISCONTINUED] magnesium citrate 1.745 GM/30ML solution Drink 1 bottle at 8pm the night before surgery 296 mL 0 3/31/2024 at 2000    [DISCONTINUED] metroNIDAZOLE (FLAGYL) 500 MG tablet Take 1 tablet (500 mg) by mouth  every 6 hours At 8:00 am, 2:00 pm, 8:00 pm the day prior to your surgery with neomycin and zofran. 3 tablet 0 3/31/2024 at 2000    [DISCONTINUED] neomycin (MYCIFRADIN) 500 MG tablet Take 2 tablets (1,000 mg) by mouth every 6 hours At 8:00 am, 2:00 pm, 8:00 pm the day prior to your surgery with flagyl and zofran. 6 tablet 0 3/31/2024 at 2000    [DISCONTINUED] ondansetron (ZOFRAN) 4 MG tablet Take 1 tablet (4 mg) by mouth every 6 hours At 8:00 am, 2:00 pm, 8:00 pm the day prior to your surgery with neomycin and flagyl. 3 tablet 0 3/31/2024 at 2000    [DISCONTINUED] polyethylene glycol (MIRALAX) 17 GM/Dose powder Please take 238 grams mixed with 64 oz of Gatorade at 4pm the night before surgery 238 g 0 3/31/2024 at 1745              Discharge Medications:     Current Discharge Medication List        START taking these medications    Details   acetaminophen (TYLENOL) 500 MG tablet Take 2 tablets (1,000 mg) by mouth every 6 hours  Qty: 24 tablet, Refills: 0    Associated Diagnoses: Acute post-operative pain      oxyCODONE (ROXICODONE) 5 MG tablet Take 1 tablet (5 mg) by mouth every 4 hours as needed for moderate pain  Qty: 5 tablet, Refills: 0    Associated Diagnoses: Acute post-operative pain           STOP taking these medications       magnesium citrate 1.745 GM/30ML solution Comments:   Reason for Stopping:         metroNIDAZOLE (FLAGYL) 500 MG tablet Comments:   Reason for Stopping:         neomycin (MYCIFRADIN) 500 MG tablet Comments:   Reason for Stopping:         ondansetron (ZOFRAN) 4 MG tablet Comments:   Reason for Stopping:         polyethylene glycol (MIRALAX) 17 GM/Dose powder Comments:   Reason for Stopping:                      Brief History of Illness:   33 year old female, PMH of sigmoid cancer s/p prior laparoscopic sigmoid colectomy with end colostomy and appendectomy.  Declined adjuvant therapy at the time.  Found to have a soft tissue nodular density in the left paracolic gutter concerning for  "metastatic lesion, a subcentimeter low-attenuation lesion in right hepatic lobe which was new from previous scans.  Pt then underwent 8 cycles of FOLFOX completed 2/24.  PET scan showed decreased size of the left paracolic gutter mass.  Pt now has undergone the above procedure.            Hospital Course:   Post-operatively pt was gently fluid resuscitated.  Perry was removed and pt was able to void.  Pt had eventual return of bowel function and was able to tolerate small amounts of a regular diet.  AIMEE drain and subcutaneous midline incisional penrose drain was removed prior to discharge.  Old ostomy site wound is to be covered and changed daily at home.  Post-operative pain was controlled with scheduled tylenol and prn oxycodone for which pt was taking minimal.      Patient is to follow up in the Colon and Rectal Surgery Clinic in 2-3 week with Marleny Velez NP or Radha Ruiz PA-C and then with Dr. Adams in 3-4 weeks after.  Midline incisional staples remain in place.           Day of Discharge Physical Exam:   Blood pressure 117/88, pulse 84, temperature 98.4  F (36.9  C), temperature source Oral, resp. rate 15, height 1.753 m (5' 9\"), weight 123 kg (271 lb 3.2 oz), SpO2 98%, not currently breastfeeding.    Gen: AAOx3, NAD  Pulm: Non-labored breathing  Abd: Soft, appropriately tender, no guarding/rebound   Incision C/D/I with staples and some serosang drainage.    Old ostomy site- serosang drainage.   Ext:  Warm and well-perfused         Final Pathology Result:   Pending at time of discharge           Discharge Instructions and Follow-Up:     Discharge Procedure Orders   Reason for your hospital stay   Order Comments: S/p   1. Exploratory laparotomy  2. Omentectomy  3. Excision of falciform ligament  4. Cytoreduction with excision of left pelvis peritoneal nodule (4 cm)  5. Splenic flexure mobilization  6. Colostomy take down  7. Intra-op flexible sigmoidoscopy  8. Repair of parastomal hernia  9. " Cystoscopy and stents (Dr Dewitt)     Activity   Order Comments: Your activity upon discharge:     -No lifting, pushing, pulling greater than 10 lbs and no strenuous exercise for 6 weeks   -Do not insert anything into your anus or rectum for 6 weeks  -No driving while on narcotic analgesics (i.e. Percocet, oxycodone, Vicodin)    WOUND CARE:  -Inspect your wounds daily for signs of infection (increased redness, drainage, pain)  -Keep your wound clean and dry  -You may shower, but do not soak in tub or pool  - Wound ostomy takedown site: cover with gauze and change 1-2 times per day, or more as needed for drainage. No need to pack the wound.  - Drain site: May cover with gauze or leave open to air, per your preference.  - Staples will be removed at clinic appointment. Do not scrub at your incision. May cover with gauze or leave open to air, per your preference.  - Wear abdominal binder for 2 weeks post-op.     Order Specific Question Answer Comments   Is discharge order? Yes      Adult [facility] Follow-up and recommended labs and tests   Order Comments: DIET  -Regular diet  -We recommend eating slowly, chewing thoroughly, eating small frequent meals throughout the day  -Stay well hydrated.      ACTIVITY  -No lifting, pushing, pulling greater than 10 lbs and no strenuous exercise for 6 weeks   -Do not insert anything into your anus or rectum for 6 weeks  -No driving while on narcotic analgesics (i.e. Percocet, oxycodone, Vicodin)  -No driving until you are able to fully twist to both sides or slam on brakes quickly and without any pain  -We encourage walking at least 4-5 times per day    WOUND CARE  -Inspect your wounds daily for signs of infection (increased redness, drainage, pain)  -Keep your wound clean and dry  -You may shower, but do not soak in tub or pool  - Wound ostomy takedown site: cover with gauze and change 1-2 times per day, or more as needed for drainage. No need to pack the wound.  - Drain site: May  cover with gauze or leave open to air, per your preference.  - Staples will be removed at clinic appointment. Do not scrub at your incision. May cover with gauze or leave open to air, per your preference.  - Wear abdominal binder for 2 weeks post-op.    NOTIFY  Please contact Sejal Preston RN or Shagufta Haskins RN at [phone] for problems after discharge such as:  -Temperature > 101F, chills, rigors, dizziness  -Redness around or purulent drainage from wound  -Inability to tolerate diet, nausea or vomiting  -You stop passing gas, develop significant bloating, abdominal pain  -Have blood in stools/vomit  -Have severe diarrhea/constipation  -Any other questions or concerns.  - At nights (after 4:30pm), on weekends, or if urgent, call [phone] and ask the  to speak with the on-call Colorectal Surgery resident or fellow      Medication Instructions  Some of your medications may have changed. Please take only prescribed and resumed medications     FOLLOW-UP  1.  You will need to follow-up with Marleny Velez NP or Radha Ruiz PA-C in the Colon and Rectal Surgery clinic in 2-3 week(s) and then with Dr. Hernandez in 3-4 weeks after.  Please contact our Nurses (phone # [phone]) if you have not heard from our clinic in 3 business days afer discharge to schedule a follow-up appointment.  Staples will be removed in the Colon and Rectal Surgery clinic.     2.  Please wear an abdominal binder with all mobilization (walking) for the next 2-3 weeks or until reassessed in the Colon and Rectal Surgery clinic.        Appointments on [place] and/or [facility] (with [facility] or [facility] provider or service). Call [phone] if you haven't heard regarding these appointments within 7 days of discharge.     Diet   Order Comments: Follow this diet upon discharge:     -Regular diet  -We recommend eating slowly, chewing thoroughly, eating small frequent meals throughout the day  -Stay well hydrated.     Order  Specific Question Answer Comments   Is discharge order? Yes             Home Health Care:     Not needed           Discharge Disposition:     Discharged to home      Condition at discharge: Stable      Enedina Garzon PA-C ..................4/4/2024   2:11 PM  Colon and Rectal Surgery     Pt was seen and discussed with Dr. Caballero on 4/4/2024    The above plan of care was performed and communicated to me by Dr. Hernandez    I spent 30 minute face-to-face or coordinating care of Rachell Stacy. Over 50% of our time on the unit was spent counseling the patient and/or coordinating care as documented in the assessment and plan.     [id number] post op hospital visit

## 2024-04-04 NOTE — PROGRESS NOTES
Colorectal Surgery Progress Note  [facility]  POD#3      Subjective:  no more stool since yesterday.  Passed flatus x1.  Otherwise doing well.  Ambulating, tolerating some orals.      Vitals:  Vitals:    04/03/24 1602 04/04/24 0029 04/04/24 0424 04/04/24 0735   BP: 117/70 121/79 121/84 117/88   BP Location: Right arm Left arm Left arm Right arm   Pulse: 84  92 84   Resp: 16 18 18 15   Temp: 98.4  F (36.9  C) 98.5  F (36.9  C) 98  F (36.7  C) 98.4  F (36.9  C)   TempSrc: Oral Oral Oral Oral   SpO2: 97% 97% 95% 98%   Weight:       Height:         I/O:  I/O last 3 completed shifts:  In: 1540 [P.O.:1540]  Out: 1440 [Urine:1400; Drains:40]    Physical Exam:  Gen: AAOx3, NAD, appears well  Pulm: Normal resp effort on room air   CV:       S1S2, RRR  Abd: Soft, mildly distended, appropriately tender, no guarding   Incision C/D/I with staples in place. Small penrose near midline incision with serosang drainage. AIMEE drain with serosanguineous drainage.  Ext:  Warm and well-perfused    BMP  Recent Labs   Lab 04/04/24 0418 04/03/24  1031 04/02/24  0421 04/01/24  0801 03/28/24  1602    138 137  --  139   POTASSIUM 3.7 3.9 4.2  --  4.3   CHLORIDE 105 102 104  --  104   CO2 23 25 22  --  21*   BUN 9.4 7.1 8.0  --  14.8   CR 0.63 0.77 0.69  --  0.75   GLC 90 89 108* 102* 99   MAG 1.9 1.9 2.0  --   --    PHOS 4.5 3.6 4.3  --   --      CBC  Recent Labs   Lab 04/04/24  0418 04/03/24  1031 04/02/24  0421 04/01/24  1944 03/28/24  1602   WBC 9.1 9.2 10.0  --  8.4   HGB 9.8* 10.1* 10.4*  --  10.9*   HCT 30.5* 31.7* 33.3*  --  34.3*    249 277 273 258         ASSESSMENT: This is a 33 year old female with history of sigmoid cancer s/p previous Christine's procedure, recently found to have peritoneal carcinomatosis of recurrence and known parastomal hernia. Now s/p exploratory laparotomy, omentectomy, excision of falciform ligament, cytoreduction with excision of left pelvis peritoneal nodule,  "splenic flexure mobilization, colostomy take down, flex sigmoidoscopy and repair of parastomal hernia on 4/1. Currently stable and progressing appropriately.      Neuro/Pain: oxycodone, scheduled Tylenol  CV: no acute concerns at this time  PULM: encourage incentive spirometry  GI/FEN:   -regular diet  - packing ostomy take down site.  But can cover on discharge.   : voiding  Heme: no acute concerns  ID: no acute concerns    Lines: AIMEE drain RLQ, Anterior penrose drain - likely okay to remove both prior to discharge pending outputs  Activity: as tolerated.  Ppx: Continue Lovenox prophylaxis, okay to discontinue at discharge  Dispo: Discharge later today vs tomorrow.  Will remove AIMEE drain and penrose drain prior to discharge.     Clinically Significant Risk Factors                         # Severe Obesity: Estimated body mass index is 40.05 kg/m  as calculated from the following:    Height as of this encounter: 1.753 m (5' 9\").    Weight as of this encounter: 123 kg (271 lb 3.2 oz)., PRESENT ON ADMISSION          Enedina Garzon PA-C ..................4/4/2024   8:16 AM  Colon and Rectal Surgery    Patient was seen and discussed with Dr. Caballero    The above plan of care was performed and communicated to me by Dr. Hernandez    I spent 25 minute face-to-face or coordinating care of Rachell Stacy. Over 50% of our time on the unit was spent counseling the patient and/or coordinating care as documented in the assessment and plan.     [id number] post op hospital visit        "

## 2024-04-04 NOTE — PLAN OF CARE
Nursing Focus: Discharge    D: Patient discharged to home at 1620. Patient transported in wheelchair and accompanied by mother.    I: Discharge prescriptions sent to discharge pharmacy to be filled. All discharge medications and instructions reviewed with patient. Patient instructed to call clinic triage nurse if she experiences a fever >100.4, uncontrolled nausea, vomiting, diarrhea, or pain; or experiences any signs or symptoms of bleeding. Other phone numbers to call with questions or concerns after discharge reviewed. PIV removed. Education completed.    A: Pt verbalized understanding of discharge medications and instructions. Patient will  medications at discharge pharmacy. .     P: Patient to follow-up in clinic on April 16 with Dr Hernandez.

## 2024-04-04 NOTE — PLAN OF CARE
Goal Outcome Evaluation:    Afebrile. VSS on RA. Pain managed with scheduled tylenol. Denies n/v, sob, dizziness. Midline incision WDL, TD dressing CDI, penrose having minimal drainage, AIMEE to bulb suction, abd binder in place. Regular diet, tolerating, ate 100% of lunch, not hungry for dinner. 1 episode of bloody rectal mucus, no gas this shift, active BS. PIV SL. UAL, ambulating in halls. Continue with poc.

## 2024-04-04 NOTE — PLAN OF CARE
Shift: [id number]    Neuro: A&Ox4, calls appropriately, lets needs be known. Pleasant and cooperative with cares.  VS: VSS  Respiratory: RA, no complaints of SOB, lung sounds clear. Pt using IS while awake.  Cardiac: Apical pulse regular, no complaints of chest pain, afebrile.  Pain: Pt having some abdominal soreness, PRN oxycodone given once for pain relief along with scheduled tylenol.  GI/: Adequately voiding, pt had very small stool during shift, and another smaller stool this morning. otherwise had one bloody rectal mucus episode. No complaints of N/V.  Diet: Regular diet  IV/Drips: L hand PIV, SL  Activity: Up independent  Skin/Drains: Midline abdominal incision, penrose drain with minimal output, AIMEE drain to bulb suction, colostomy takedown site.    P: Report Changes to treatment team colorectal surgery.

## 2024-04-05 ENCOUNTER — PATIENT OUTREACH (OUTPATIENT)
Dept: SURGERY | Facility: CLINIC | Age: 34
End: 2024-04-05
Payer: COMMERCIAL

## 2024-04-05 NOTE — PROGRESS NOTES
Post Op Note     Called to check on patient postoperatively after hospital discharge.       Patient is s/p ex lap, omentectomy, excision of falciform ligament, cytoreduction w/ excision of left pelvic peritoneal nodule, colostomy take down, parastomal hernia repair. with Dr. Kendrick Hernandez for colostomy status/CRS recurrence.   Admitted 4/1 and discharged on 4/4.    Pain is well controlled with tylenol and oxycodone   Wearing binder for two weeks   Patient is eating and drinking normally. Patient is on a regular diet.  Encouraged patient to drink 8-10 glasses of water a day.   Patient is passing flats, is having loose stools.   Patient is voiding normally and urine is light in color.  Patient is not set up with home care.   Patient Denies nausea and vomiting.  Patient Denies any fevers or chills.  Patient's incision is C/D/I, covering with gauze.   Patient is on a activity restriction. Lifting 10 pounds for 6 weeks.   Patient Denies needing any forms completed.   Follow up is set up with Marleny Silver NP on 4/16 and with Dr. Kendrick Hernandez on 5/7.   Encouraged the patient to contact the clinic in the meantime with any fevers, chills, nausea, vomiting, increased colostomy output, no colostomy output, dizziness, lightheadedness, uncontrolled pain, changes to the incisions, or with any questions or concerns.    Patient's questions were answered to their stated satisfaction and they are in agreement with this plan.    MARILYN Post [phone]  Colon & Rectal Surgery Clinic  [facility]

## 2024-04-09 LAB
PATH REPORT.COMMENTS IMP SPEC: ABNORMAL
PATH REPORT.COMMENTS IMP SPEC: ABNORMAL
PATH REPORT.COMMENTS IMP SPEC: YES
PATH REPORT.FINAL DX SPEC: ABNORMAL
PATH REPORT.GROSS SPEC: ABNORMAL
PATH REPORT.MICROSCOPIC SPEC OTHER STN: ABNORMAL
PATH REPORT.RELEVANT HX SPEC: ABNORMAL
PHOTO IMAGE: ABNORMAL

## 2024-04-12 ENCOUNTER — TELEPHONE (OUTPATIENT)
Dept: SURGERY | Facility: CLINIC | Age: 34
End: 2024-04-12
Payer: COMMERCIAL

## 2024-04-12 NOTE — TELEPHONE ENCOUNTER
Patient confirmed scheduled appointment:  Date: 5/14/24  Time: 2:15 pm  Visit type: Post-op  Provider: Dr. Hernandez  Location: [facility]  Testing/imaging: n/a  Additional notes: Rescheduled 5/7 Appt due to the provider being out

## 2024-04-16 ENCOUNTER — OFFICE VISIT (OUTPATIENT)
Dept: SURGERY | Facility: CLINIC | Age: 34
End: 2024-04-16
Payer: COMMERCIAL

## 2024-04-16 VITALS
HEART RATE: 102 BPM | SYSTOLIC BLOOD PRESSURE: 143 MMHG | BODY MASS INDEX: 40.05 KG/M2 | HEIGHT: 69 IN | OXYGEN SATURATION: 98 % | DIASTOLIC BLOOD PRESSURE: 81 MMHG | TEMPERATURE: 98.3 F

## 2024-04-16 DIAGNOSIS — Z09 FOLLOW-UP EXAMINATION AFTER COLORECTAL SURGERY: Primary | ICD-10-CM

## 2024-04-16 PROCEDURE — 99024 POSTOP FOLLOW-UP VISIT: CPT | Performed by: NURSE PRACTITIONER

## 2024-04-16 ASSESSMENT — PAIN SCALES - GENERAL: PAINLEVEL: NO PAIN (0)

## 2024-04-16 NOTE — PROGRESS NOTES
"Colon and Rectal Surgery Postoperative Clinic Note    RE: Rachell Stacy  : 1990  MYNOR: 2024    Rachell Stacy is a very pleasant 33 year old female with PMH of sigmoid cancer s/p prior laparoscopic sigmoid colectomy with end colostomy and appendectomy.  Declined adjuvant therapy at the time.  Found to have a soft tissue nodular density in the left paracolic gutter concerning for metastatic lesion, a subcentimeter low-attenuation lesion in right hepatic lobe which was new from previous scans.  Pt then underwent 8 cycles of FOLFOX completed .  PET scan showed decreased size of the left paracolic gutter mass. She is now status post exploratory laparotomy, omentectomy, excision of falciform ligament, cytoreduction with excision of left pelvis peritoneal nodule, splenic flexure mobilization, colostomy takedown, intraoperative flexible sigmoidoscopy, repair of parastomal hernia with Dr. Hernandez and cysto with stents with Dr. Dewitt on 24.    Final Diagnosis   A. FALCIFORM LIGAMENT, EXCISION:  - Benign fibroadipose tissue with no evidence of malignancy     B. OMENTUM, OMENTECTOMY: :  - Fibroadipose tissue with evidence of malignancy     C. PELVIC PERITONEAL NODULE, :  - Positive for adenocarcinoma with partial treatment effect     D. ANASTOMOTIC RINGS:  - Colonic wall with no evidence of dysplasia or malignancy  - One benign lymph node (0/1)     E. COLON, COLOSTOMY TAKEDOWN:  - Colonic wall with no evidence of dysplasia or malignancy  - Two benign lymph node (0/2)     F. HERNIAL SAC:  - Fragment of fibroadipose tissue with focal chronic inflammation     Interval history: Rachell has been doing well. No significant pain. Only taking occasional tylenol. No fevers or chills. Having normal bowel movements. No nausea or vomiting.     Physical Examination:  BP (!) 143/81 (BP Location: Left arm, Patient Position: Sitting, Cuff Size: Adult Regular)   Pulse 102   Temp 98.3  F (36.8  C)   Ht 5' 9\"   SpO2 98%  "  BMI 40.05 kg/m    General: alert, oriented, in no acute distress, sitting comfortably  HEENT: mucous membranes moist  Abdomen: midline incision well approximated without erythema or drainage. Staples removed and steri strips placed.      Assessment/Plan:  33 year old female status post exploratory laparotomy, omentectomy, excision of falciform ligament, cytoreduction with excision of left pelvis peritoneal nodule, splenic flexure mobilization, colostomy takedown, intraoperative flexible sigmoidoscopy, repair of parastomal hernia with Dr. Hernandez and cysto with stents with Dr. Dewitt on 4/1/24. She is recovering very well from surgery.  No significant pain.  Staples removed and no signs of infection.  Tolerating a regular diet and having normal bowel movements.  She is scheduled to follow-up with her medical oncologist soon and scheduled for follow up next month with Dr. Hernandez. Encouraged her to contact the clinic in the meantime with any questions or concerns.    Medical history:  Past Medical History:   Diagnosis Date    Colostomy status (H)     Malignant neoplasm of sigmoid colon (H)     Peritoneal carcinomatosis (H)        Surgical history:  Past Surgical History:   Procedure Laterality Date    COMBINED CYSTOSCOPY, INSERT STENT URETER(S) Bilateral 4/1/2024    Procedure: Cystoscopy, with Ureteral Stent Placement and Removal;  Surgeon: Rachele Dewitt MD;  Location: [facility] OR    Cystoscopy, placement of bilateral ureteral stents  05/04/2023    FLEXIBLE SIGMOIDOSCOPY      LAPAROTOMY, TUMOR DEBULKING, COMBINED N/A 4/1/2024    Procedure: Exploratory laparotomy, omentectomy, cytoreduction of peritoneal nodule, intra-op flexible sigmoidoscopy, excision of falciform ligament, splenic flexure mobilization and repair of parastomal hernia;  Surgeon: Kendrick Hernandez MD;  Location: [facility] OR    Sigmoid resection, colostomy, appendectomy  05/04/2023    TAKEDOWN COLOSTOMY N/A 4/1/2024    Procedure: colostomy takedown;   "Surgeon: Kendrick Hernandez MD;  Location: [facility] OR    [place] teeth extraction          Problem list:    Patient Active Problem List    Diagnosis Date Noted    Peritoneal carcinomatosis (H) 04/01/2024     Priority: Medium       Medications:  Current Outpatient Medications   Medication Sig Dispense Refill    acetaminophen (TYLENOL) 500 MG tablet Take 2 tablets (1,000 mg) by mouth every 6 hours 24 tablet 0    oxyCODONE (ROXICODONE) 5 MG tablet Take 1 tablet (5 mg) by mouth every 4 hours as needed for moderate pain (Patient not taking: Reported on 4/16/2024) 5 tablet 0       Allergies:  No Known Allergies    Family history:  Family History   Problem Relation Age of Onset    Anemia Mother     Thyroid Disease Mother     Sleep Apnea Father     Factor V Leiden deficiency Father     Pulmonary Embolism Father     Deep Vein Thrombosis Father     Colon Cancer Maternal Grandmother     Thyroid Disease Maternal Grandmother     Hypertension Maternal Grandfather     Alcoholism Paternal Grandfather     Anesthesia Reaction No family hx of     Bleeding Disorder No family hx of        Social history:  Social History     Tobacco Use    Smoking status: Never    Smokeless tobacco: Never   Substance Use Topics    Alcohol use: Not Currently     Marital status: single.    Nursing Notes:   Latisha Hicks  4/16/2024 12:55 PM  Signed  No chief complaint on file.      Vitals:    04/16/24 1251   BP: (!) 143/81   BP Location: Left arm   Patient Position: Sitting   Cuff Size: Adult Regular   Pulse: 102   Temp: 98.3  F (36.8  C)   SpO2: 98%   Height: 1.753 m (5' 9\")       Body mass index is 40.05 kg/m .                          ANSHU Alves       15 minutes spent on the date of the encounter doing chart review, history and exam, documentation and further activities as noted above.   This is a postop visit.    MARISOL Lara, NP-C  Colon and Rectal Surgery  [facility]    This note was created using " speech recognition software and may contain unintended word substitutions.

## 2024-04-16 NOTE — LETTER
2024       RE: Rachell Stacy  [address] [facility] [address]     Dear Colleague,    Thank you for referring your patient, Rachell Stacy, to the [facility] COLON AND RECTAL SURGERY CLINIC [place] at [facility]. Please see a copy of my visit note below.    Colon and Rectal Surgery Postoperative Clinic Note    RE: Rachell Stacy  : 1990  MYNOR: 2024    Rachell Stacy is a very pleasant 33 year old female with PMH of sigmoid cancer s/p prior laparoscopic sigmoid colectomy with end colostomy and appendectomy.  Declined adjuvant therapy at the time.  Found to have a soft tissue nodular density in the left paracolic gutter concerning for metastatic lesion, a subcentimeter low-attenuation lesion in right hepatic lobe which was new from previous scans.  Pt then underwent 8 cycles of FOLFOX completed .  PET scan showed decreased size of the left paracolic gutter mass. She is now status post exploratory laparotomy, omentectomy, excision of falciform ligament, cytoreduction with excision of left pelvis peritoneal nodule, splenic flexure mobilization, colostomy takedown, intraoperative flexible sigmoidoscopy, repair of parastomal hernia with Dr. Hernandez and cysto with stents with Dr. Dewitt on 24.    Final Diagnosis   A. FALCIFORM LIGAMENT, EXCISION:  - Benign fibroadipose tissue with no evidence of malignancy     B. OMENTUM, OMENTECTOMY: :  - Fibroadipose tissue with evidence of malignancy     C. PELVIC PERITONEAL NODULE, :  - Positive for adenocarcinoma with partial treatment effect     D. ANASTOMOTIC RINGS:  - Colonic wall with no evidence of dysplasia or malignancy  - One benign lymph node (0/1)     E. COLON, COLOSTOMY TAKEDOWN:  - Colonic wall with no evidence of dysplasia or malignancy  - Two benign lymph node (0/2)     F. HERNIAL SAC:  - Fragment of fibroadipose tissue with focal chronic inflammation     Interval history: Rachell has been  "doing well. No significant pain. Only taking occasional tylenol. No fevers or chills. Having normal bowel movements. No nausea or vomiting.     Physical Examination:  BP (!) 143/81 (BP Location: Left arm, Patient Position: Sitting, Cuff Size: Adult Regular)   Pulse 102   Temp 98.3  F (36.8  C)   Ht 5' 9\"   SpO2 98%   BMI 40.05 kg/m    General: alert, oriented, in no acute distress, sitting comfortably  HEENT: mucous membranes moist  Abdomen: midline incision well approximated without erythema or drainage. Staples removed and steri strips placed.      Assessment/Plan:  33 year old female status post exploratory laparotomy, omentectomy, excision of falciform ligament, cytoreduction with excision of left pelvis peritoneal nodule, splenic flexure mobilization, colostomy takedown, intraoperative flexible sigmoidoscopy, repair of parastomal hernia with Dr. Hernandez and cysto with stents with Dr. Dewitt on 4/1/24. She is recovering very well from surgery.  No significant pain.  Staples removed and no signs of infection.  Tolerating a regular diet and having normal bowel movements.  She is scheduled to follow-up with her medical oncologist soon and scheduled for follow up next month with Dr. Hernandez. Encouraged her to contact the clinic in the meantime with any questions or concerns.    Medical history:  Past Medical History:   Diagnosis Date    Colostomy status (H)     Malignant neoplasm of sigmoid colon (H)     Peritoneal carcinomatosis (H)        Surgical history:  Past Surgical History:   Procedure Laterality Date    COMBINED CYSTOSCOPY, INSERT STENT URETER(S) Bilateral 4/1/2024    Procedure: Cystoscopy, with Ureteral Stent Placement and Removal;  Surgeon: Rachele Dewitt MD;  Location: [facility] OR    Cystoscopy, placement of bilateral ureteral stents  05/04/2023    FLEXIBLE SIGMOIDOSCOPY      LAPAROTOMY, TUMOR DEBULKING, COMBINED N/A 4/1/2024    Procedure: Exploratory laparotomy, omentectomy, cytoreduction of " "peritoneal nodule, intra-op flexible sigmoidoscopy, excision of falciform ligament, splenic flexure mobilization and repair of parastomal hernia;  Surgeon: Kendrick Hernandez MD;  Location: [facility] OR    Sigmoid resection, colostomy, appendectomy  05/04/2023    TAKEDOWN COLOSTOMY N/A 4/1/2024    Procedure: colostomy takedown;  Surgeon: Kendrick Hernandez MD;  Location: [facility] OR    [place] teeth extraction          Problem list:    Patient Active Problem List    Diagnosis Date Noted    Peritoneal carcinomatosis (H) 04/01/2024     Priority: Medium       Medications:  Current Outpatient Medications   Medication Sig Dispense Refill    acetaminophen (TYLENOL) 500 MG tablet Take 2 tablets (1,000 mg) by mouth every 6 hours 24 tablet 0    oxyCODONE (ROXICODONE) 5 MG tablet Take 1 tablet (5 mg) by mouth every 4 hours as needed for moderate pain (Patient not taking: Reported on 4/16/2024) 5 tablet 0       Allergies:  No Known Allergies    Family history:  Family History   Problem Relation Age of Onset    Anemia Mother     Thyroid Disease Mother     Sleep Apnea Father     Factor V Leiden deficiency Father     Pulmonary Embolism Father     Deep Vein Thrombosis Father     Colon Cancer Maternal Grandmother     Thyroid Disease Maternal Grandmother     Hypertension Maternal Grandfather     Alcoholism Paternal Grandfather     Anesthesia Reaction No family hx of     Bleeding Disorder No family hx of        Social history:  Social History     Tobacco Use    Smoking status: Never    Smokeless tobacco: Never   Substance Use Topics    Alcohol use: Not Currently     Marital status: single.    Nursing Notes:   Latisha Hicks  4/16/2024 12:55 PM  Signed  No chief complaint on file.      Vitals:    04/16/24 1251   BP: (!) 143/81   BP Location: Left arm   Patient Position: Sitting   Cuff Size: Adult Regular   Pulse: 102   Temp: 98.3  F (36.8  C)   SpO2: 98%   Height: 1.753 m (5' 9\")       Body mass index is 40.05 kg/m .      Latisha Hicks, EMT       15 " minutes spent on the date of the encounter doing chart review, history and exam, documentation and further activities as noted above.   This is a postop visit.      This note was created using speech recognition software and may contain unintended word substitutions.          Again, thank you for allowing me to participate in the care of your patient.      Sincerely,    MARISOL Issa CNP

## 2024-04-16 NOTE — NURSING NOTE
"No chief complaint on file.      Vitals:    04/16/24 1251   BP: (!) 143/81   BP Location: Left arm   Patient Position: Sitting   Cuff Size: Adult Regular   Pulse: 102   Temp: 98.3  F (36.8  C)   SpO2: 98%   Height: 1.753 m (5' 9\")       Body mass index is 40.05 kg/m .                          Latisha Hicks EMT    "

## 2024-04-18 ENCOUNTER — TRANSFERRED RECORDS (OUTPATIENT)
Dept: HEALTH INFORMATION MANAGEMENT | Facility: CLINIC | Age: 34
End: 2024-04-18

## 2024-04-23 ENCOUNTER — LAB REQUISITION (OUTPATIENT)
Dept: LAB | Facility: CLINIC | Age: 34
End: 2024-04-23
Payer: COMMERCIAL

## 2024-04-26 NOTE — PROGRESS NOTES
Colon and Rectal Surgery Clinic Note    RE: Rachell Stacy.  : 1990.  MYNOR: 2024.    Reason for visit: post op.    HPI: Rachell Stacy is a 33 year old female who presents today for a post op visit. On 2023 she was admitted to the hospital for abdominal pain and was found to have a perforated sigmoid cancer involving the appendix. She underwent a laparoscopic sigmoid colectomy with end colostomy and appendectomy with Dr. Bina Cedillo. Pathology demonstrated moderately differentiated sigmoid adenocarcinoma, MMR intact, [id number] (3/28 lymph nodes positive). On pathology consult there was positive LVI, PNI, TB and one of the positive lymph nodes was from the mesentery. She follows with Dr. Bryan Boewrs at [facility] who recommended adjuvant chemotherapy however pt politely declined at that time as she wanted to seek holistic measures. CT Chest/Abdomen/Pelvis on 10/26/2023 demonstrated a soft tissue nodular density in the left paracolic gutter concerning for metastatic lesion, a subcentimeter low-attenuation lesion in right hepatic lobe which is new from previous scan, and a 2mm nodule in the left lung, unchanged. CEA on 2023 was 1.9.      She underwent 8 cycles of FOLFOX from 2023-2024. PET on 3/12/2024 demonstrated a decreased size of the left paracolic gutter mass. There was no other evidence of metastatic disease.  The liver lesion seen on previous scan was not visible on PET. There was an unchanged 2mm left lung nodule. CEA on 2024 was 2.1. She is now s/p exploratory laparotomy, omentectomy, excision of falciform ligament, cytoreduction with excision of left pelvis peritoneal nodule, splenic flexure mobilization, colostomy takedown, intraoperative flexible sigmoidoscopy, repair of parastomal hernia on 24. Surgical Pathology was positive for adenocarcinoma of the pelvic peritoneal nodule.      Interval History: Doing well, great recovery, normal bowel function.      Pathology consult from surgery on 5/4/2023:  SIGMOID COLON AND APPENDIX, SIGMOIDECTOMY AND EN BLOC APPENDECTOMY:  Adenocarcinoma, sigmoid colon:   -Moderately differentiated; size= 10.4 x 9.1 x 8.0 cm   -Tumor invades through the visceral peritoneum with gross perforation    -Lymphovascular and perineural invasion are identified   -Positive for metastasis to three of twenty-eight local lymph nodes (3 /28)   -Margins: Negative for involvement by carcinoma:     (closest margin: mesenteric margin, 1.0 cm clear)     (one of positive lymph nodes present at the mesenteric margin)     (distance from proximal & distal margins: 11 cm & 16 cm respectively)   -Positive for tumor deposit x1 focus   -Negative for high score tumor budding (score = 0-4)   -AJCC/TNM stage: pT4a N1   -MMR intact by immunohistochemistry by report in a pre-op biopsy:     ([facility] endoscopic biopsy ref. #[id number] dated 4/27/2023)  Appendix: Benign appendix with fibrous serosal adhesions     CEA (9/25/2023):    CT Chest/Abdomen/Pelvis (10/26/2023):       CT Chest/Abdomen/Pelvis (1/12/2024):       PET (3/12/2024):        Surgical Pathology (4/1/2024):  A. FALCIFORM LIGAMENT, EXCISION:  - Benign fibroadipose tissue with no evidence of malignancy     B. OMENTUM, OMENTECTOMY: :  - Fibroadipose tissue with evidence of malignancy     C. PELVIC PERITONEAL NODULE, :  - Positive for adenocarcinoma with partial treatment effect     D. ANASTOMOTIC RINGS:  - Colonic wall with no evidence of dysplasia or malignancy  - One benign lymph node (0/1)     E. COLON, COLOSTOMY TAKEDOWN:  - Colonic wall with no evidence of dysplasia or malignancy  - Two benign lymph node (0/2)     F. HERNIAL SAC:  - Fragment of fibroadipose tissue with focal chronic inflammation    Medications:  Current Outpatient Medications   Medication Sig Dispense Refill    acetaminophen (TYLENOL) 500 MG tablet Take 2 tablets (1,000 mg) by mouth every 6 hours 24 tablet 0    oxyCODONE (ROXICODONE) 5 MG  tablet Take 1 tablet (5 mg) by mouth every 4 hours as needed for moderate pain (Patient not taking: Reported on 4/16/2024) 5 tablet 0       Allergies:  No Known Allergies    Social history:   Social History     Tobacco Use    Smoking status: Never    Smokeless tobacco: Never   Substance Use Topics    Alcohol use: Not Currently     Marital status: single.    ROS:  A complete review of systems was performed with the patient and all systems negative except as per HPI.    Physical Examination:  Exam was chaperoned by Fabricio Capone EMT-P  /84 (BP Location: Left arm, Patient Position: Sitting, Cuff Size: Adult Regular)   Pulse 80   SpO2 95%   General: Well hydrated. No acute distress.  Abdomen: Soft, NT, well healed surgical incision. No inguinal adenopathy palpated.      ASSESSMENT     This is a 33 year old F with a moderately differentiated sigmoid adenocarcinoma, MMR intact, [id number] s/p Christine's who subsequently developed peritoneal metastases and is now s/p adjuvant treatment (which was initially declined) and cytoreduction. She is recovering well.    All pertinent labs and imaging were personally reviewed by me.     PLAN  - Due for colonoscopy, [place] in 2 months (preferred with me)  - Continue care with onc for surveillance  - Healthy life style and weight loss were encouraged  - RTC as needed    20 minutes spent on the date of the encounter doing chart review, history and exam, imaging review, documentation and further activities as noted above.      Kendrick Hernandez MD    Division of Colon and Rectal Surgery  [facility]    Referring Provider:  Referred Self, MD  No address on file     Primary Care Provider:  Shanna Hassan

## 2024-05-14 ENCOUNTER — OFFICE VISIT (OUTPATIENT)
Dept: SURGERY | Facility: CLINIC | Age: 34
End: 2024-05-14
Payer: COMMERCIAL

## 2024-05-14 VITALS — SYSTOLIC BLOOD PRESSURE: 123 MMHG | DIASTOLIC BLOOD PRESSURE: 84 MMHG | OXYGEN SATURATION: 95 % | HEART RATE: 80 BPM

## 2024-05-14 DIAGNOSIS — C78.6 PERITONEAL CARCINOMATOSIS (H): Primary | ICD-10-CM

## 2024-05-14 DIAGNOSIS — C18.7 MALIGNANT NEOPLASM OF SIGMOID COLON (H): ICD-10-CM

## 2024-05-14 DIAGNOSIS — Z12.11 ENCOUNTER FOR SCREENING COLONOSCOPY: ICD-10-CM

## 2024-05-14 LAB
BKR LAB AP ADD'L TEST STATUS: NORMAL
BKR PATH ADDL TEST FINAL COMMENTS: NORMAL

## 2024-05-14 PROCEDURE — 99024 POSTOP FOLLOW-UP VISIT: CPT | Performed by: SURGERY

## 2024-05-14 ASSESSMENT — PAIN SCALES - GENERAL: PAINLEVEL: NO PAIN (0)

## 2024-05-14 NOTE — LETTER
2024       RE: Rachell Stacy  [address] [facility] [address]     Dear Colleague,    Thank you for referring your patient, Rachell Stacy, to the [facility] COLON AND RECTAL SURGERY CLINIC [place] at [facility]. Please see a copy of my visit note below.    Colon and Rectal Surgery Clinic Note    RE: Rachell Stacy.  : 1990.  MYNOR: 2024.    Reason for visit: post op.    HPI: Rachell Stacy is a 33 year old female who presents today for a post op visit. On 2023 she was admitted to the hospital for abdominal pain and was found to have a perforated sigmoid cancer involving the appendix. She underwent a laparoscopic sigmoid colectomy with end colostomy and appendectomy with Dr. Bina Cedillo. Pathology demonstrated moderately differentiated sigmoid adenocarcinoma, MMR intact, [id number] (3/28 lymph nodes positive). On pathology consult there was positive LVI, PNI, TB and one of the positive lymph nodes was from the mesentery. She follows with Dr. Bryan Bowers at [facility] who recommended adjuvant chemotherapy however pt politely declined at that time as she wanted to seek holistic measures. CT Chest/Abdomen/Pelvis on 10/26/2023 demonstrated a soft tissue nodular density in the left paracolic gutter concerning for metastatic lesion, a subcentimeter low-attenuation lesion in right hepatic lobe which is new from previous scan, and a 2mm nodule in the left lung, unchanged. CEA on 2023 was 1.9.      She underwent 8 cycles of FOLFOX from 2023-2024. PET on 3/12/2024 demonstrated a decreased size of the left paracolic gutter mass. There was no other evidence of metastatic disease.  The liver lesion seen on previous scan was not visible on PET. There was an unchanged 2mm left lung nodule. CEA on 2024 was 2.1. She is now s/p exploratory laparotomy, omentectomy, excision of falciform ligament, cytoreduction with excision of left  pelvis peritoneal nodule, splenic flexure mobilization, colostomy takedown, intraoperative flexible sigmoidoscopy, repair of parastomal hernia on 4/1/24. Surgical Pathology was positive for adenocarcinoma of the pelvic peritoneal nodule.      Interval History: Doing well, great recovery, normal bowel function.     Pathology consult from surgery on 5/4/2023:  SIGMOID COLON AND APPENDIX, SIGMOIDECTOMY AND EN BLOC APPENDECTOMY:  Adenocarcinoma, sigmoid colon:   -Moderately differentiated; size= 10.4 x 9.1 x 8.0 cm   -Tumor invades through the visceral peritoneum with gross perforation    -Lymphovascular and perineural invasion are identified   -Positive for metastasis to three of twenty-eight local lymph nodes (3 /28)   -Margins: Negative for involvement by carcinoma:     (closest margin: mesenteric margin, 1.0 cm clear)     (one of positive lymph nodes present at the mesenteric margin)     (distance from proximal & distal margins: 11 cm & 16 cm respectively)   -Positive for tumor deposit x1 focus   -Negative for high score tumor budding (score = 0-4)   -AJCC/TNM stage: pT4a N1   -MMR intact by immunohistochemistry by report in a pre-op biopsy:     ([facility] endoscopic biopsy ref. #[id number] dated 4/27/2023)  Appendix: Benign appendix with fibrous serosal adhesions     CEA (9/25/2023):    CT Chest/Abdomen/Pelvis (10/26/2023):       CT Chest/Abdomen/Pelvis (1/12/2024):       PET (3/12/2024):        Surgical Pathology (4/1/2024):  A. FALCIFORM LIGAMENT, EXCISION:  - Benign fibroadipose tissue with no evidence of malignancy     B. OMENTUM, OMENTECTOMY: :  - Fibroadipose tissue with evidence of malignancy     C. PELVIC PERITONEAL NODULE, :  - Positive for adenocarcinoma with partial treatment effect     D. ANASTOMOTIC RINGS:  - Colonic wall with no evidence of dysplasia or malignancy  - One benign lymph node (0/1)     E. COLON, COLOSTOMY TAKEDOWN:  - Colonic wall with no evidence of dysplasia or malignancy  - Two benign  lymph node (0/2)     F. HERNIAL SAC:  - Fragment of fibroadipose tissue with focal chronic inflammation    Medications:  Current Outpatient Medications   Medication Sig Dispense Refill    acetaminophen (TYLENOL) 500 MG tablet Take 2 tablets (1,000 mg) by mouth every 6 hours 24 tablet 0    oxyCODONE (ROXICODONE) 5 MG tablet Take 1 tablet (5 mg) by mouth every 4 hours as needed for moderate pain (Patient not taking: Reported on 4/16/2024) 5 tablet 0       Allergies:  No Known Allergies    Social history:   Social History     Tobacco Use    Smoking status: Never    Smokeless tobacco: Never   Substance Use Topics    Alcohol use: Not Currently     Marital status: single.    ROS:  A complete review of systems was performed with the patient and all systems negative except as per HPI.    Physical Examination:  Exam was chaperoned by ANSHU Billy-P  /84 (BP Location: Left arm, Patient Position: Sitting, Cuff Size: Adult Regular)   Pulse 80   SpO2 95%   General: Well hydrated. No acute distress.  Abdomen: Soft, NT, well healed surgical incision. No inguinal adenopathy palpated.      ASSESSMENT     This is a 33 year old F with a moderately differentiated sigmoid adenocarcinoma, MMR intact, [id number] s/p Christine's who subsequently developed peritoneal metastases and is now s/p adjuvant treatment (which was initially declined) and cytoreduction. She is recovering well.    All pertinent labs and imaging were personally reviewed by me.     PLAN  - Due for colonoscopy, [place] in 2 months (preferred with me)  - Continue care with onc for surveillance  - Healthy life style and weight loss were encouraged  - RTC as needed    20 minutes spent on the date of the encounter doing chart review, history and exam, imaging review, documentation and further activities as noted above.      Referring Provider:  Referred Self, MD  No address on file     Primary Care Provider:  Shanna Hassan      Again, thank you for allowing me to  participate in the care of your patient.      Sincerely,    Kendrick Hernandez MD

## 2024-05-14 NOTE — NURSING NOTE
Chief Complaint   Patient presents with    Post-op Visit       Vitals:    05/14/24 1400   BP: 123/84   BP Location: Left arm   Patient Position: Sitting   Cuff Size: Adult Regular   Pulse: 80   SpO2: 95%       There is no height or weight on file to calculate BMI.    Fabricio Capone EMT-P

## 2024-05-16 ENCOUNTER — TELEPHONE (OUTPATIENT)
Dept: GASTROENTEROLOGY | Facility: CLINIC | Age: 34
End: 2024-05-16
Payer: COMMERCIAL

## 2024-05-16 DIAGNOSIS — Z12.11 SPECIAL SCREENING FOR MALIGNANT NEOPLASMS, COLON: Primary | ICD-10-CM

## 2024-05-16 NOTE — TELEPHONE ENCOUNTER
"Endoscopy Scheduling Screen    Have you had a positive Covid test in the last 14 days?  No    What is your communication preference for Instructions and/or Bowel Prep?   Patient request both.     What insurance is in the chart?  Other:  [employer]    Ordering/Referring Provider: Kendrick Hernandez MD in [facility]    (If ordering provider performs procedure, schedule with ordering provider unless otherwise instructed. )    BMI: Estimated body mass index is 40.05 kg/m  as calculated from the following:    Height as of 4/16/24: 1.753 m (5' 9\").    Weight as of 4/2/24: 123 kg (271 lb 3.2 oz).     Sedation Ordered  moderate sedation.   If patient BMI > 50 do not schedule in [facility].    If patient BMI > 45 do not schedule at [facility].    Are you taking methadone or Suboxone?  No    Have you had difficulties, pain, or discomfort during past endoscopy procedures?  No    Are you taking any prescription medications for pain 3 or more times per week?   NO, No RN review required.    Do you have a history of malignant hyperthermia?  No    (Females) Are you currently pregnant?   No     Have you been diagnosed or told you have pulmonary hypertension?   No    Do you have an LVAD?  No    Have you been told you have moderate to severe sleep apnea?  No    Have you been told you have COPD, asthma, or any other lung disease?  No    Do you have any heart conditions?  No     Have you ever had or are you waiting for an organ transplant?  No. Continue scheduling, no site restrictions.    Have you had a stroke or transient ischemic attack (TIA aka \"mini stroke\" in the last 6 months?   No    Have you been diagnosed with or been told you have cirrhosis of the liver?   No    Are you currently on dialysis?   No    Do you need assistance transferring?   No    BMI: Estimated body mass index is 40.05 kg/m  as calculated from the following:    Height as of 4/16/24: 1.753 m (5' 9\").    Weight as of 4/2/24: 123 kg (271 lb 3.2 oz).     Is " patients BMI > 40 and scheduling location [facility]?  Yes (If MAC sedation is ordered, schedule PAC eval)    Do you take an injectable medication for weight loss or diabetes (excluding insulin)?  No    Do you take the medication Naltrexone?  No    Do you take blood thinners?  No       Prep   Are you currently on dialysis or do you have chronic kidney disease?  No    Do you have a diagnosis of diabetes?  No    Do you have a diagnosis of cystic fibrosis (CF)?  No    On a regular basis do you go 3 -5 days between bowel movements?  No    BMI > 40?  No    Preferred Pharmacy:    [id number] - [place], [place] - [address]  [address] [place] [address]  Phone: [phone] Fax: [phone]    [id number] - [place], [place] - [address]  [address] [address]  Phone: [phone] Fax: [phone]      Final Scheduling Details     Procedure scheduled  Colonoscopy    Surgeon:  Mary.      Date of procedure:  08/30/2024     Pre-OP / PAC:   No - Not required for this site.    Location  [facility] - [facility] - Patient preference.    Sedation   MAC/Deep Sedation  Per block available with the provider patient wants.       Patient Reminders:   You will receive a call from a Nurse to review instructions and health history.  This assessment must be completed prior to your procedure.  Failure to complete the Nurse assessment may result in the procedure being cancelled.      On the day of your procedure, please designate an adult(s) who can drive you home stay with you for the next 24 hours. The medicines used in the exam will make you sleepy. You will not be able to drive.      You cannot take public transportation, ride share services, or non-medical taxi service without a responsible caregiver.  Medical transport services are allowed with the requirement that a responsible caregiver will receive you at your destination.  We require that drivers and caregivers are confirmed prior to your  procedure.

## 2024-05-16 NOTE — LETTER
2024      Rachell Stacy  [address] [address]              Golytely Extended Bowel Prep   Prep instructions for your colonoscopy   For prep questions, please call: [facility] - [address] [place] [address], [address]th Floor, [place] [address] - [phone] option 4    Bowel prep was sent 2024 to [id number] - [place], [place] - [address]    Please read these instructions carefully at least 7 days prior to your colonoscopy procedure. Be sure to follow all directions completely. The inside of your colon must be clean to allow for a complete examination for the presence of any growths, polyps, and/or abnormalities, as well as their biopsy or removal. A number of tips are included in order to make this part of the procedure as comfortable as possible.    Getting ready      prescription at the pharmacy. Endoscopy team will order this about 2 weeks before to your scheduled procedure. Included in the kit will be the followin  Dulcolax (Bisacodyl) 5mg tablets  Two containers of Polyethylene glycol (Golytely, Colyte, Nulytely, Gavilyte-G)    A nurse will call you to go over your appointment details and prep instructions. Not completing the nurse call could result with your appointment being cancelled.    You must arrange for an adult to drive you home after your exam. Your colonoscopy cannot be done unless you have a ride. If you need to use public transportation, someone must ride with you and stay with you for up to 24 hours.       7 days before procedure     Consult with your prescribing provider about stopping any:     Diabetic/Weight Loss Injectable Medication GLP-1 agonist (such as Exenatide (Byetta, Bydureon),  Mounjaro (Tirzepatide).  Ozempic (Semaglutide). Semaglutide. Symlin (Pamlintide), Tanzeum (Albiglutide). Tirzepatide-Weight Management (Zepbound), Trulicity (Dulaglutide), Victoza (Saxenda, Liraglutide), Wegovy (Semaglutide) please follow below guidelines for  holding:    For weekly injection HOLD 7 days before procedure.  For once or twice a day injection HOLD the day before procedure and day of procedure.  For oral, daily dosing (Rybelsus) HOLD 7 days before procedure.    Blood thinning and/or anti-platelet medications: such as Coumadin, Plavix, Xarelto, Eliquis, Lovenox or others, these medications may need to be stopped temporarily before your procedure.     If you take insulin for diabetes, ask your prescribing provider for instructions on how to manage this medication while preparing for a colonoscopy.     NSAIDs medications such as Sulindac, Celebrex, Mobic, Relafen or others may need to be stopped before the procedure. This will be discussed during nurse review call, or you can reach out to your prescribing provider.      Stop taking iron (ferrous sulfate), multivitamins that contain iron, and/or fiber supplements (Metamucil, Benefiber, Psyllium husk powder, Fibercon, Bran, etc.).      Stop eating whole kernel corn, popcorn, nuts, and foods that contain seeds. These can stay in the colon for many days, and they can clog up the colonoscope.    Begin a low-fiber diet. (See examples below). No Olestra (a fat substitute).   Consume no more than 10-15 grams of fiber each day.         LOW FIBER DIET   You may have: Do not have:    Starches: White bread, rolls, biscuits, croissants, Julia toast, white flour tortillas, waffles, pancakes, Macedonian toast; white rice, noodles, pasta, macaroni; cooked and peeled potatoes; plain crackers, saltines; cooked farina or cream of rice; puffed rice, corn flakes, Rice Krispies, Special K      Vegetables: tender cooked and canned, vegetable broths     Fruits and fruit juices: Strained fruit juice, canned fruit without seeds or skin (not pineapple), applesauce, pear sauce, ripe bananas, melons (not watermelon)     Milk products: Milk (plain or flavored), cheese, cottage cheese, yogurt (no berries), custard, ice cream       Proteins:  Tender, well-cooked ground beef, lamb, veal, ham, pork, chicken, turkey, fish or organ meat, Tofu, eggs, creamy peanut butter      Fats and condiments: Margarine, butter, oils, mayonnaise, sour cream, salad dressing, plain gravy; spices, cooked herbs; sugar, clear jelly, honey, syrup      Snacks, sweets and drinks: Pretzels, hard candy; plain cakes and cookies (no nuts or seeds); gelatin, plain pudding, sherbet, Popsicles; coffee, tea, carbonated ( fizzy ) drinks     Starches: Breads or rolls that contain nuts, seeds or fruit; whole wheat or whole grain breads that contain more than 2 grams of fiber per serving; cornbread; corn or whole wheat tortillas; potatoes with skin; brown rice, wild rice, quinoa, kasha (buckwheat), and oatmeal      Vegetables: Any raw or steamed vegetables; vegetables with seeds; corn in any form      Fruits and fruit juices: Prunes, prune juice, raisins and other dried fruits, berries and other fruits with seeds, canned pineapple juices with pulp such as orange, grapefruit, pineapple or tomato juice     Milk products: Any yogurt with nuts, seeds or berries      Proteins: Tough, fibrous meats with gristle; cooked dried beans, peas or lentils; crunchy peanut butter     Fats and condiments: Pickles, olives, relish, horseradish; jam, marmalade, preserves      Snacks, sweets and drinks: Popcorn, nuts, seeds, granola, coconut, candies made with nuts or seeds; all desserts that contain nuts, seeds, raisins and other dried fruits, coconut, whole grains or bran.       2 days before procedure       You may have a light, low fiber breakfast and lunch. Begin a clear liquid diet AFTER 1 PM. Do not eat solid foods. (See examples below).    Drink at least eight to ten 8-ounce glasses of water throughout the day  ? ? ? ? ? ? ? ?    Fill one container of Golytely with a full gallon of warm water. Cover and shake until well mixed. Chill in refrigerator until it is time to drink solution.     CLEAR LIQUID  DIET:  You can have: Do not have:    Water, tea, coffee (no milk or cream)   Soda pop, Gatorade (not red or purple)   Coconut water   Jell-O, Popsicles (no milk or fruit pieces - not red or purple)   Fat-free soup broth or bouillon   Plain hard candy, such as clear life savers (not red or purple)   Clear juices and fruit-flavored drinks, such as apple juice, white grape juice, Hi-C, and Vishal-Aid (not red or purple)    Milk or milk products such as ice cream, malts or shakes, or coffee creamer   Red or purple drinks of any kind such as cranberry juice, grape juice, or Vishal-Aid. Avoid red or purple Jell-O, Popsicles, sorbet, sherbet and candy.   Juices with pulp such as orange, grapefruit, pineapple, or tomato juice   Cream soups of any kind   Alcohol and beer   Protein drinks or protein powder     Step 1     At 4 PM, take 2 Dulcolax (Bisacodyl) tablets.  At 5 PM, start drinking one 8-ounce glass of Golytely mixture every 15 minutes until the container is HALF empty. Drink each glass quickly. Store the rest in the refrigerator.   Continue to drink clear liquids.      Reminders While Drinking Laxatives:     After you start drinking the solution, stay near a toilet. You may have watery stools (diarrhea), mild cramping, bloating, and nausea. You may want to use Vaseline on the skin around your anus after each bowel movement or use wet wipes to prevent irritation. Bowel movements will be liquid and dark in color at first and then should turn clear yellow in color. Drinking the prepared solution may make you cold, wearing warm clothing can be helpful.    Some find it helpful to:  For added flavor, include a crystal light lemonade packet in each glass of Golytely, rather than mixing it into the entire prepared mixture.  In between each glass, try sucking on a lemon or a piece of hard candy to help diminish the flavor of the preparation.  Drinking from a straw can help minimize the taste of the prepared mixture.    If you  have nausea or vomiting during drinking the solution, rinse your mouth with water and take a 15-30 minute break and then continue drinking solution.         1 day before procedure       Continue on a clear liquid diet. Do not eat solid foods.    Drink at least eight to ten 8-ounce glasses of water throughout the day  ? ? ? ? ? ? ? ?    Stop taking any NSAIDs pain relievers such as Advil, Ibuprofen, Motrin, etc. You may take Tylenol.      Step 2     At 4 PM, take 2 Dulcolax (Bisacodyl) tablets.  At 5 PM, start drinking the 2nd half of Golytely mixture. Drink one 8-ounce glass of Golytely mixture every 15 minutes until the container is empty.  Drink each glass quickly.   Continue to drink clear liquids.    Before you go to bed mix the second container of Golytely and place in the refrigerator for the morning.     Day of procedure     Step 3     6 hours before your arrival time, start drinking one 8-ounce glass of Golytely mixture every 15 minutes until the container is HALF empty. You will not drink the entire container. The remaining solution can be discarded.     2 hours before your arrival time stop drinking all liquids, including water.   Do not smoke or swallow anything, including water or gum for at least 2 hours before your arrival time. This is a safety issue. Your procedure could be cancelled if you do not follow directions.  No chewing tobacco 6 hours prior to procedure arrival time.     You may take your necessary morning medications with sips of water (4 ounces).   Do not take diabetes medicine by mouth until after your exam.  If you have asthma, bring your inhalers.  Please perform your nebulizer treatments and airway clearance therapy in the morning prior to the procedure (if applicable).    Arrive with a responsible adult who can drive you home and stay with you for a minimum of 24 hours. The medications used during the procedure will make you sleepy, so you won't be able to drive yourself home.   You  cannot use public transportation, ride-share services, or non-medical taxi services without a responsible caregiver. Medical transport services are okay, but a caregiver must be there to receive you at your destination.  Please check in with your  when you arrive. Drivers should stay on campus.    Expect to be at the procedure center for about 1.5-2.5 hours.    Do not wear jewelry (i.e. earrings, rings, necklaces, watches, etc.). Leave your purse, billfold, credit cards, and other valuables at home.      Bring insurance card and ID.         Answers to Commonly Asked Questions     How soon can I eat after the procedure?  You may resume your normal diet when you feel ready, unless advised otherwise by the doctor performing your procedure. We recommend starting with a light meal.   Do not drink alcohol for 24 hours after your procedure.  You may resume normal activities (work, exercise, etc.) after 24 hours.    How will I feel after the procedure?  It is normal to feel bloated and gassy after your procedure. Walking will help move the air through your colon. You can take non-aspirin pain relievers that contain acetaminophen (Tylenol).  If you are having sedation, we require a responsible adult to take you home for your safety. The sedation medicines used to relax you during the procedure can impair your judgement and reaction time and make you forgetful and possibly a little unsteady.  Do not drive, make any important decisions, or sign any legal documents for 24 hours after your procedure.    When will I get my test results?  You should have your procedure results and any lab results (if applicable) by letter, MyChart message, or phone call within 2 weeks. If you have any questions, please call the doctor that referred you for the procedure.    How do I know if my colon is cleaned out?   After completing the bowel prep, your bowel movements should be all liquid and yellow. Your bowel movements will look similar  to urine in the toilet. If there are pieces of stool (poop) in the toilet, or if you can't see to the bottom of the toilet, please call our office for advice. Call [phone] and ask to speak with a nurse.    Why is the Golytely bowel prep taken in several steps?   The stool is flushed out by a large wave of fluid going through the colon. Just sipping a large volume of the solution will not achieve the desired result. Studies have shown that two smaller waves (or more in some cases) are better than one large one.      What if I need to cancel or reschedule my procedure?  Contact our endoscopy scheduling team at [phone], option 2. Monday through Friday, 7:00am-5:00pm.

## 2024-05-17 ENCOUNTER — TRANSFERRED RECORDS (OUTPATIENT)
Dept: HEALTH INFORMATION MANAGEMENT | Facility: CLINIC | Age: 34
End: 2024-05-17

## 2024-08-09 RX ORDER — BISACODYL 5 MG/1
TABLET, DELAYED RELEASE ORAL
Qty: 4 TABLET | Refills: 0 | Status: SHIPPED | OUTPATIENT
Start: 2024-08-09

## 2024-08-09 NOTE — TELEPHONE ENCOUNTER
Extended Golytely Bowel Prep  recommended due to BMI > 40.  Instructions were sent via [employer] and letter. Bowel prep was sent 8/9/2024 to [facility][id number] - [place], [place] - [address]

## 2024-08-19 ENCOUNTER — TRANSFERRED RECORDS (OUTPATIENT)
Dept: HEALTH INFORMATION MANAGEMENT | Facility: CLINIC | Age: 34
End: 2024-08-19
Payer: COMMERCIAL

## 2024-08-21 ENCOUNTER — TELEPHONE (OUTPATIENT)
Dept: GASTROENTEROLOGY | Facility: CLINIC | Age: 34
End: 2024-08-21

## 2024-08-21 NOTE — TELEPHONE ENCOUNTER
Pre visit planning completed.      Procedure details:    Patient scheduled for Colonoscopy on 8/30/24.     Arrival time: 1115. Procedure time 1215    Facility location: [facility]; [address] [place] [address], 5th Floor, [place] [address]. Check in location: 5th Floor.    Sedation type: MAC    Pre op exam needed? No.    Indication for procedure: Screening      Chart review:     Electronic implanted devices? No    Recent diagnosis of diverticulitis within the last 6 weeks? No      Medication review:    Diabetic? No    Anticoagulants? No    Weight loss medication/injectable? No GLP-1 medication per patient's medication list.  RN will verify with pre-assessment call.    NSAIDS? No    Other medication HOLDING recommendations:  N/A      Prep for procedure:     Bowel prep recommendation: Extended Golytely.     Bowel prep prescription sent to    [facility][id number] - [place], [place] - [address]    Due to: BMI > 40.     Prep instructions sent via anita Stevens RN  Endoscopy Procedure Pre Assessment RN  [phone] option 4

## 2024-08-22 NOTE — TELEPHONE ENCOUNTER
Pre assessment completed for upcoming procedure.      Procedure details:    Patient scheduled for Colonoscopy on 8/30/24.     Arrival time: 1115. Procedure time 1215    Facility location: [facility]; [address] [place] [address], 5th Floor, [place] [address]. Check in location: 5th Floor.    Sedation type: MAC    Pre op exam needed? No.    Indication for procedure: screening colonoscopy, personal history of cancer    Designated  policy reviewed. Instructed to have someone stay 24 hours post procedure.       Chart review:     Electronic implanted devices? No    Recent diagnosis of diverticulitis within the last 6 weeks?  No      Medication review:    Diabetic? No    Anticoagulants? No    Weight loss medication/injectable? No.    NSAIDS? Yes.  Ibuprofen (Advil, Motrin).  Holding interval of 1 day before procedure.    Other medication HOLDING recommendations:  N/A      Prep for procedure:     Bowel prep recommendation: Extended Golytely. Bowel prep prescription sent to    [facility][id number] - [place], [place] - [address]  Due to: BMI > 40.     Prep instructions sent via [employer]     Reviewed procedure prep instructions.     Patient verbalized understanding and had no questions or concerns at this time.        Nirmala Cullen RN  Endoscopy Procedure Pre Assessment   [phone] option 4

## 2024-08-29 ENCOUNTER — TRANSFERRED RECORDS (OUTPATIENT)
Dept: HEALTH INFORMATION MANAGEMENT | Facility: CLINIC | Age: 34
End: 2024-08-29
Payer: COMMERCIAL

## 2024-08-29 ENCOUNTER — ANESTHESIA EVENT (OUTPATIENT)
Dept: SURGERY | Facility: AMBULATORY SURGERY CENTER | Age: 34
End: 2024-08-29
Payer: COMMERCIAL

## 2024-08-29 NOTE — ANESTHESIA PREPROCEDURE EVALUATION
Anesthesia Pre-Procedure Evaluation    Patient: Rachell Stacy   MRN: [id number] : 1990        Procedure : Procedure(s):  Colonoscopy          Past Medical History:   Diagnosis Date     Colostomy status (H)      Malignant neoplasm of sigmoid colon (H)      Peritoneal carcinomatosis (H)       Past Surgical History:   Procedure Laterality Date     COMBINED CYSTOSCOPY, INSERT STENT URETER(S) Bilateral 2024    Procedure: Cystoscopy, with Ureteral Stent Placement and Removal;  Surgeon: Rachele Dewitt MD;  Location: [facility]     Cystoscopy, placement of bilateral ureteral stents  2023     FLEXIBLE SIGMOIDOSCOPY       LAPAROTOMY, TUMOR DEBULKING, COMBINED N/A 2024    Procedure: Exploratory laparotomy, omentectomy, cytoreduction of peritoneal nodule, intra-op flexible sigmoidoscopy, excision of falciform ligament, splenic flexure mobilization and repair of parastomal hernia;  Surgeon: Kendrick Hernandez MD;  Location: [facility]     Sigmoid resection, colostomy, appendectomy  2023     TAKEDOWN COLOSTOMY N/A 2024    Procedure: colostomy takedown;  Surgeon: Kendrick Hernandez MD;  Location: [facility]     Brick teeth extraction         No Known Allergies   Social History     Tobacco Use     Smoking status: Never     Smokeless tobacco: Never   Substance Use Topics     Alcohol use: Not Currently      Wt Readings from Last 1 Encounters:   24 123 kg (271 lb 3.2 oz)        Anesthesia Evaluation   Pt has had prior anesthetic. Type: General.    No history of anesthetic complications       ROS/MED HX  ENT/Pulmonary:  - neg pulmonary ROS     Neurologic:  - neg neurologic ROS     Cardiovascular:       METS/Exercise Tolerance: >4 METS    Hematologic:     (+)      anemia,          Musculoskeletal:       GI/Hepatic: Comment: History of colon CA. S/p sx and chemotherapy. Last chemo was 2024    (+)        bowel prep,         (-) GERD   Renal/Genitourinary:  - neg Renal ROS     Endo:     (+)                "Obesity,       Psychiatric/Substance Use:  - neg psychiatric ROS     Infectious Disease:       Malignancy: Comment: Patient seems to have stable mets at this point in time  (+) Malignancy, History of GI.GI CA  Active status post Surgery and Chemo.      Other: Comment: Patient has PORT access           Physical Exam    Airway        Mallampati: II   TM distance: > 3 FB   Neck ROM: full   Mouth opening: > 3 cm    Respiratory Devices and Support         Dental       (+) Minor Abnormalities - some fillings, tiny chips      Cardiovascular   cardiovascular exam normal          Pulmonary   pulmonary exam normal            OUTSIDE LABS:  CBC:   Lab Results   Component Value Date    WBC 9.1 04/04/2024    WBC 9.2 04/03/2024    HGB 9.8 (L) 04/04/2024    HGB 10.1 (L) 04/03/2024    HCT 30.5 (L) 04/04/2024    HCT 31.7 (L) 04/03/2024     04/04/2024     04/03/2024     BMP:   Lab Results   Component Value Date     04/04/2024     04/03/2024    POTASSIUM 3.7 04/04/2024    POTASSIUM 3.9 04/03/2024    CHLORIDE 105 04/04/2024    CHLORIDE 102 04/03/2024    CO2 23 04/04/2024    CO2 25 04/03/2024    BUN 9.4 04/04/2024    BUN 7.1 04/03/2024    CR 0.63 04/04/2024    CR 0.77 04/03/2024    GLC 90 04/04/2024    GLC 89 04/03/2024     COAGS: No results found for: \"PTT\", \"INR\", \"FIBR\"  POC: No results found for: \"BGM\", \"HCG\", \"HCGS\"  HEPATIC:   Lab Results   Component Value Date    ALBUMIN 4.2 03/28/2024    PROTTOTAL 7.4 03/28/2024    ALT 21 03/28/2024    AST 22 03/28/2024    ALKPHOS 106 03/28/2024    BILITOTAL 0.2 03/28/2024     OTHER:   Lab Results   Component Value Date    ROBERT 9.0 04/04/2024    PHOS 4.5 04/04/2024    MAG 1.9 04/04/2024       Anesthesia Plan    ASA Status:  3    NPO Status:  NPO Appropriate    Anesthesia Type: MAC.     - Reason for MAC: immobility needed              Consents    Anesthesia Plan(s) and associated risks, benefits, and realistic alternatives discussed. Questions answered and " patient/representative(s) expressed understanding.     - Discussed:     - Discussed with:  Patient      - Extended Intubation/Ventilatory Support Discussed: No.      - Patient is DNR/DNI Status: No     Use of blood products discussed: No .     Postoperative Care       PONV prophylaxis: Background Propofol Infusion     Comments:               Shama Rosas MD    I have reviewed the pertinent notes and labs in the chart from the past 30 days and (re)examined the patient.  Any updates or changes from those notes are reflected in this note.

## 2024-08-30 ENCOUNTER — ANESTHESIA (OUTPATIENT)
Dept: SURGERY | Facility: AMBULATORY SURGERY CENTER | Age: 34
End: 2024-08-30
Payer: COMMERCIAL

## 2024-08-30 ENCOUNTER — HOSPITAL ENCOUNTER (OUTPATIENT)
Facility: AMBULATORY SURGERY CENTER | Age: 34
Discharge: HOME | End: 2024-08-30
Attending: SURGERY | Admitting: SURGERY
Payer: COMMERCIAL

## 2024-08-30 VITALS — HEART RATE: 78 BPM

## 2024-08-30 VITALS
OXYGEN SATURATION: 99 % | DIASTOLIC BLOOD PRESSURE: 56 MMHG | WEIGHT: 293 LBS | SYSTOLIC BLOOD PRESSURE: 109 MMHG | HEIGHT: 69 IN | BODY MASS INDEX: 43.4 KG/M2 | RESPIRATION RATE: 16 BRPM | TEMPERATURE: 97.3 F | HEART RATE: 65 BPM

## 2024-08-30 LAB
COLONOSCOPY: NORMAL
HCG UR QL: NEGATIVE
INTERNAL QC OK POCT: NORMAL
POCT KIT EXPIRATION DATE: NORMAL
POCT KIT LOT NUMBER: NORMAL

## 2024-08-30 PROCEDURE — 45378 DIAGNOSTIC COLONOSCOPY: CPT | Performed by: ANESTHESIOLOGY

## 2024-08-30 PROCEDURE — 45378 DIAGNOSTIC COLONOSCOPY: CPT

## 2024-08-30 PROCEDURE — 81025 URINE PREGNANCY TEST: CPT | Performed by: PATHOLOGY

## 2024-08-30 PROCEDURE — 45378 DIAGNOSTIC COLONOSCOPY: CPT | Mod: 33

## 2024-08-30 RX ORDER — PROPOFOL 10 MG/ML
INJECTION, EMULSION INTRAVENOUS CONTINUOUS PRN
Status: DISCONTINUED | OUTPATIENT
Start: 2024-08-30 | End: 2024-08-30

## 2024-08-30 RX ORDER — PROPOFOL 10 MG/ML
INJECTION, EMULSION INTRAVENOUS PRN
Status: DISCONTINUED | OUTPATIENT
Start: 2024-08-30 | End: 2024-08-30

## 2024-08-30 RX ORDER — SODIUM CHLORIDE, SODIUM LACTATE, POTASSIUM CHLORIDE, CALCIUM CHLORIDE 600; 310; 30; 20 MG/100ML; MG/100ML; MG/100ML; MG/100ML
INJECTION, SOLUTION INTRAVENOUS CONTINUOUS PRN
Status: DISCONTINUED | OUTPATIENT
Start: 2024-08-30 | End: 2024-08-30

## 2024-08-30 RX ORDER — HEPARIN SODIUM (PORCINE) LOCK FLUSH IV SOLN 100 UNIT/ML 100 UNIT/ML
5-10 SOLUTION INTRAVENOUS
Status: DISCONTINUED | OUTPATIENT
Start: 2024-08-30 | End: 2024-08-31 | Stop reason: HOSPADM

## 2024-08-30 RX ADMIN — HEPARIN SODIUM (PORCINE) LOCK FLUSH IV SOLN 100 UNIT/ML 5 ML: 100 SOLUTION at 12:51

## 2024-08-30 RX ADMIN — SODIUM CHLORIDE, SODIUM LACTATE, POTASSIUM CHLORIDE, CALCIUM CHLORIDE: 600; 310; 30; 20 INJECTION, SOLUTION INTRAVENOUS at 12:02

## 2024-08-30 RX ADMIN — PROPOFOL 20 MG: 10 INJECTION, EMULSION INTRAVENOUS at 12:07

## 2024-08-30 RX ADMIN — PROPOFOL 50 MG: 10 INJECTION, EMULSION INTRAVENOUS at 12:06

## 2024-08-30 RX ADMIN — PROPOFOL 200 MCG/KG/MIN: 10 INJECTION, EMULSION INTRAVENOUS at 12:04

## 2024-08-30 NOTE — ANESTHESIA CARE TRANSFER NOTE
Patient: Rachell Stacy    Procedure: Procedure(s):  Colonoscopy       Diagnosis: Encounter for screening colonoscopy [Z12.11]  Diagnosis Additional Information: No value filed.    Anesthesia Type:   MAC     Note:    Oropharynx: oropharynx clear of all foreign objects  Level of Consciousness: drowsy  Oxygen Supplementation: nasal cannula    Independent Airway: airway patency satisfactory and stable  Dentition: dentition unchanged  Vital Signs Stable: post-procedure vital signs reviewed and stable  Report to RN Given: handoff report given  Patient transferred to: Phase II    Handoff Report: Identifed the Patient, Identified the Reponsible Provider, Reviewed the pertinent medical history, Discussed the surgical course, Reviewed Intra-OP anesthesia mangement and issues during anesthesia, Set expectations for post-procedure period and Allowed opportunity for questions and acknowledgement of understanding  Vitals:  Vitals Value Taken Time   /56 08/30/24 1244   Temp 36.3  C (97.3  F) 08/30/24 1244   Pulse 65 08/30/24 1244   Resp 16 08/30/24 1244   SpO2 99 % 08/30/24 1244       Electronically Signed By: Shama Rosas MD  August 30, 2024  1:22 PM

## 2024-08-30 NOTE — ANESTHESIA POSTPROCEDURE EVALUATION
Patient: Rachell Stacy    Procedure: Procedure(s):  Colonoscopy       Anesthesia Type:  MAC    Note:  Disposition: Outpatient   Postop Pain Control: Uneventful            Sign Out: Well controlled pain   PONV: No   Neuro/Psych: Uneventful            Sign Out: Acceptable/Baseline neuro status   Airway/Respiratory: Uneventful            Sign Out: Acceptable/Baseline resp. status   CV/Hemodynamics: Uneventful            Sign Out: Acceptable CV status; No obvious hypovolemia; No obvious fluid overload   Other NRE: NONE   DID A NON-ROUTINE EVENT OCCUR? No       Last vitals:  Vitals Value Taken Time   /56 08/30/24 1244   Temp 36.3  C (97.3  F) 08/30/24 1244   Pulse 65 08/30/24 1244   Resp 16 08/30/24 1244   SpO2 99 % 08/30/24 1244       Electronically Signed By: Shama Rosas MD  August 30, 2024  1:04 PM

## 2024-08-30 NOTE — H&P
Rachell Stacy  [id number]  female  33 year old      Reason for procedure/surgery: Surveillance after colon cancer    Patient Active Problem List   Diagnosis    Peritoneal carcinomatosis (H)       Past Surgical History:    Past Surgical History:   Procedure Laterality Date    COMBINED CYSTOSCOPY, INSERT STENT URETER(S) Bilateral 4/1/2024    Procedure: Cystoscopy, with Ureteral Stent Placement and Removal;  Surgeon: Rachele Dewitt MD;  Location: [facility]    Cystoscopy, placement of bilateral ureteral stents  05/04/2023    FLEXIBLE SIGMOIDOSCOPY      LAPAROTOMY, TUMOR DEBULKING, COMBINED N/A 4/1/2024    Procedure: Exploratory laparotomy, omentectomy, cytoreduction of peritoneal nodule, intra-op flexible sigmoidoscopy, excision of falciform ligament, splenic flexure mobilization and repair of parastomal hernia;  Surgeon: Kendrick Hernandez MD;  Location: [facility]    Sigmoid resection, colostomy, appendectomy  05/04/2023    TAKEDOWN COLOSTOMY N/A 4/1/2024    Procedure: colostomy takedown;  Surgeon: Kendrick Hernandez MD;  Location: [facility]    [place] teeth extraction          Past Medical History:   Past Medical History:   Diagnosis Date    Colostomy status (H)     Malignant neoplasm of sigmoid colon (H)     Peritoneal carcinomatosis (H)        Social History:   Social History     Tobacco Use    Smoking status: Never    Smokeless tobacco: Never   Substance Use Topics    Alcohol use: Not Currently       Family History:   Family History   Problem Relation Age of Onset    Anemia Mother     Thyroid Disease Mother     Sleep Apnea Father     Factor V Leiden deficiency Father     Pulmonary Embolism Father     Deep Vein Thrombosis Father     Colon Cancer Maternal Grandmother     Thyroid Disease Maternal Grandmother     Hypertension Maternal Grandfather     Alcoholism Paternal Grandfather     Anesthesia Reaction No family hx of     Bleeding Disorder No family hx of        Allergies: No Known Allergies    Active Medications:   Current  "Outpatient Medications   Medication Sig Dispense Refill    acetaminophen (TYLENOL) 500 MG tablet Take 2 tablets (1,000 mg) by mouth every 6 hours 24 tablet 0    bisacodyl (DULCOLAX) 5 MG EC tablet Two days prior to exam take two (2) tablets at 4pm. One day prior to exam take two (2) tablets at 4pm 4 tablet 0    oxyCODONE (ROXICODONE) 5 MG tablet Take 1 tablet (5 mg) by mouth every 4 hours as needed for moderate pain (Patient not taking: Reported on 4/16/2024) 5 tablet 0    polyethylene glycol (GOLYTELY) 236 g suspension Two days before procedure at 5PM fill first container with water. Mix and drink an 8 oz glass every 10-15 minutes until HALF of the container is gone. Place the remainder in the refrigerator. One day before procedure at 5PM drink second half of bowel prep. Drink an 8 oz glass every 10-15 minutes until it is gone. Day of procedure 6 hours before arrival time fill the 2nd container with water. Mix and drink an 8 oz glass every 10-15 minutes until HALF of the container is gone. Discard the remaining solution. 8000 mL 0       Physical Examination:   Vital Signs: BP (!) 140/82 (BP Location: Right arm)   Pulse 98   Temp 98.2  F (36.8  C) (Temporal)   Resp 18   Ht 1.753 m (5' 9\")   Wt 139.9 kg (308 lb 8 oz)   SpO2 97%   BMI 45.56 kg/m    GA: NAD, A&O x3  L: nonlabored breathing on RA  CV: RRR  ABD: non tender  Neuro: grossly normal    Plan: Appropriate to proceed as scheduled.      Kendrick Hernandez MD    Division of Colon & Rectal Surgery  Department of Surgery  [facility]  [phone]    8/30/2024    PCP:  Shanna Hassan"

## 2024-10-30 ENCOUNTER — TRANSFERRED RECORDS (OUTPATIENT)
Dept: HEALTH INFORMATION MANAGEMENT | Facility: CLINIC | Age: 34
End: 2024-10-30
Payer: COMMERCIAL

## 2024-10-31 ENCOUNTER — TRANSFERRED RECORDS (OUTPATIENT)
Dept: HEALTH INFORMATION MANAGEMENT | Facility: CLINIC | Age: 34
End: 2024-10-31
Payer: COMMERCIAL

## 2024-11-13 ENCOUNTER — TRANSFERRED RECORDS (OUTPATIENT)
Dept: HEALTH INFORMATION MANAGEMENT | Facility: CLINIC | Age: 34
End: 2024-11-13
Payer: COMMERCIAL

## 2024-12-02 ENCOUNTER — TRANSFERRED RECORDS (OUTPATIENT)
Dept: HEALTH INFORMATION MANAGEMENT | Facility: CLINIC | Age: 34
End: 2024-12-02
Payer: COMMERCIAL

## 2024-12-30 ENCOUNTER — TRANSFERRED RECORDS (OUTPATIENT)
Dept: HEALTH INFORMATION MANAGEMENT | Facility: CLINIC | Age: 34
End: 2024-12-30
Payer: COMMERCIAL

## 2025-01-25 ENCOUNTER — HEALTH MAINTENANCE LETTER (OUTPATIENT)
Age: 35
End: 2025-01-25

## 2025-02-10 ENCOUNTER — TRANSFERRED RECORDS (OUTPATIENT)
Dept: HEALTH INFORMATION MANAGEMENT | Facility: CLINIC | Age: 35
End: 2025-02-10

## 2025-02-24 ENCOUNTER — TRANSFERRED RECORDS (OUTPATIENT)
Dept: HEALTH INFORMATION MANAGEMENT | Facility: CLINIC | Age: 35
End: 2025-02-24
Payer: COMMERCIAL

## 2025-03-10 ENCOUNTER — TRANSFERRED RECORDS (OUTPATIENT)
Dept: HEALTH INFORMATION MANAGEMENT | Facility: CLINIC | Age: 35
End: 2025-03-10

## 2025-07-28 ENCOUNTER — TRANSFERRED RECORDS (OUTPATIENT)
Dept: HEALTH INFORMATION MANAGEMENT | Facility: CLINIC | Age: 35
End: 2025-07-28
Payer: COMMERCIAL

## (undated) DEVICE — SU MONOCRYL 2-0 SH 27" UND Y417H

## (undated) DEVICE — GLOVE BIOGEL PI MICRO INDICATOR UNDERGLOVE SZ 7.5 48975

## (undated) DEVICE — SU PDS II 0 CTX 36" Z370T

## (undated) DEVICE — WIPES FOLEY CARE SURESTEP PROVON DFC100

## (undated) DEVICE — KIT PATIENT POSITIONING PIGAZZI LATEX FREE 40580

## (undated) DEVICE — Device

## (undated) DEVICE — STPL CIRCULAR 29MM CVD CDH29P

## (undated) DEVICE — SUCTION MANIFOLD NEPTUNE 2 SYS 4 PORT 0702-020-000

## (undated) DEVICE — TUBING SUCTION MEDI-VAC 1/4"X20' N620A

## (undated) DEVICE — PACKING NUGAUZE 1" PLAIN 7633

## (undated) DEVICE — SPECIMEN CONTAINER 3OZ W/FORMALIN 59901

## (undated) DEVICE — SU PROLENE 3-0 SHDA 36" 8522H

## (undated) DEVICE — SOL WATER IRRIG 500ML BOTTLE 2F7113

## (undated) DEVICE — STPL LINEAR CUT 75MM TLC75

## (undated) DEVICE — ENDO TUBING CO2 SMARTCAP STERILE DISP 100145CO2EXT

## (undated) DEVICE — DRAIN JACKSON PRATT ROUND SIL 19FR W/TROCAR LF JP-2232

## (undated) DEVICE — DRAPE IOBAN INCISE 23X17" 6650EZ

## (undated) DEVICE — KIT CONNECTOR FOR OLYMPUS ENDOSCOPES DEFENDO 100310

## (undated) DEVICE — GUIDEWIRE SENSOR DUAL FLEX STR 0.035"X150CM M0066703080

## (undated) DEVICE — SUCTION MANIFOLD NEPTUNE 2 SYS 1 PORT 702-025-000

## (undated) DEVICE — PREP BRUSH SURG SCRUB PROVIDONE IODINE 9% 20ML

## (undated) DEVICE — CATH URETERAL OPEN END 05FR 70CM 020015

## (undated) DEVICE — SU VICRYL 3-0 SH 27" UND J416H

## (undated) DEVICE — KIT ENDO TURNOVER/PROCEDURE CARRY-ON 101822

## (undated) DEVICE — PAD CHUX UNDERPAD 23X24" 7136

## (undated) DEVICE — SU SILK 0 TIE 6X30" A306H

## (undated) DEVICE — DRAIN PENROSE 5/8X18" LATEX FREE GR204

## (undated) DEVICE — SU SILK 3-0 TIE 12X30" A304H

## (undated) DEVICE — ESU LIGASURE IMPACT OPEN SEALER/DVDR CVD LG JAW LF4418

## (undated) DEVICE — DRAPE LEGGINGS CLEAR 8430

## (undated) DEVICE — SU VICRYL 2-0 CTX 36" J369H

## (undated) DEVICE — GLOVE BIOGEL PI ULTRATOUCH SZ 7.0 41170

## (undated) DEVICE — LINEN TOWEL PACK X6 WHITE 5487

## (undated) DEVICE — LINEN TOWEL PACK X30 5481

## (undated) DEVICE — PREP POVIDONE-IODINE 7.5% SCRUB 4OZ BOTTLE MDS093945

## (undated) DEVICE — TAPE CLOTH ADHESIVE 3" LATEX 3554C

## (undated) DEVICE — GLOVE BIOGEL PI MICRO SZ 7.0 48570

## (undated) DEVICE — DRAIN PENROSE 0.25"X18" LATEX FREE GR201

## (undated) DEVICE — ENDO SEALING CAP SMARTCAP

## (undated) DEVICE — SYR 10ML LL W/O NDL 302995

## (undated) DEVICE — SPONGE LAP 18X18" X8435

## (undated) DEVICE — TUBING SUCTION 10'X3/16" N510

## (undated) DEVICE — PROTECTOR ARM ONE-STEP TRENDELENBURG 40418

## (undated) DEVICE — DRAIN JACKSON PRATT RESERVOIR 100ML SU130-1305

## (undated) DEVICE — SU SILK 2-0 TIE 12X30" A305H

## (undated) DEVICE — TUBING IRRIG CYSTO/BLADDER SET 81" LF 2C4040

## (undated) DEVICE — PREP CHLORAPREP 26ML TINTED HI-LITE ORANGE 930815

## (undated) DEVICE — LINEN GOWN XLG 5407

## (undated) DEVICE — GOWN IMPERVIOUS 2XL BLUE

## (undated) DEVICE — CATH TRAY FOLEY SURESTEP 16FR W/URNE MTR STLK LATEX A303316A

## (undated) DEVICE — DRSG PRIMAPORE 02X3" 7133

## (undated) DEVICE — DRSG MEDIPORE 3 1/2X13 3/4" 3573

## (undated) DEVICE — LINEN TOWEL PACK X5 5464

## (undated) DEVICE — SU ETHILON 2-0 FS 18" 664H

## (undated) DEVICE — SU PROLENE 2-0 SHDA 36" 8523H

## (undated) DEVICE — DRSG GAUZE 4X4" TRAY 6939

## (undated) DEVICE — SUCTION TIP POOLE K770

## (undated) DEVICE — SOL WATER IRRIG 1000ML BOTTLE 2F7114

## (undated) DEVICE — ENDO CAP AND TUBING STERILE FOR ENDOGATOR  100130

## (undated) DEVICE — GOWN XLG DISP 9545

## (undated) DEVICE — TUBING SUCTION MEDI-VAC SOFT 3/16"X20' N520A

## (undated) DEVICE — BASIN SET SINGLE STERILE 13752-624

## (undated) RX ORDER — ONDANSETRON 2 MG/ML
INJECTION INTRAMUSCULAR; INTRAVENOUS
Status: DISPENSED
Start: 2024-04-01

## (undated) RX ORDER — FENTANYL CITRATE 50 UG/ML
INJECTION, SOLUTION INTRAMUSCULAR; INTRAVENOUS
Status: DISPENSED
Start: 2024-04-01

## (undated) RX ORDER — PROPOFOL 10 MG/ML
INJECTION, EMULSION INTRAVENOUS
Status: DISPENSED
Start: 2024-04-01

## (undated) RX ORDER — HYDRALAZINE HYDROCHLORIDE 20 MG/ML
INJECTION INTRAMUSCULAR; INTRAVENOUS
Status: DISPENSED
Start: 2024-04-01

## (undated) RX ORDER — FENTANYL CITRATE-0.9 % NACL/PF 10 MCG/ML
PLASTIC BAG, INJECTION (ML) INTRAVENOUS
Status: DISPENSED
Start: 2024-04-01

## (undated) RX ORDER — HYDROMORPHONE HYDROCHLORIDE 1 MG/ML
INJECTION, SOLUTION INTRAMUSCULAR; INTRAVENOUS; SUBCUTANEOUS
Status: DISPENSED
Start: 2024-04-01

## (undated) RX ORDER — ACETAMINOPHEN 325 MG/1
TABLET ORAL
Status: DISPENSED
Start: 2024-04-01

## (undated) RX ORDER — DEXAMETHASONE SODIUM PHOSPHATE 4 MG/ML
INJECTION, SOLUTION INTRA-ARTICULAR; INTRALESIONAL; INTRAMUSCULAR; INTRAVENOUS; SOFT TISSUE
Status: DISPENSED
Start: 2024-04-01

## (undated) RX ORDER — METRONIDAZOLE 500 MG/100ML
INJECTION, SOLUTION INTRAVENOUS
Status: DISPENSED
Start: 2024-04-01

## (undated) RX ORDER — CEFAZOLIN SODIUM/WATER 2 G/20 ML
SYRINGE (ML) INTRAVENOUS
Status: DISPENSED
Start: 2024-04-01

## (undated) RX ORDER — ALBUTEROL SULFATE 90 UG/1
AEROSOL, METERED RESPIRATORY (INHALATION)
Status: DISPENSED
Start: 2024-04-01

## (undated) RX ORDER — EPINEPHRINE 0.1 MG/ML
INJECTION INTRAVENOUS
Status: DISPENSED
Start: 2024-04-01

## (undated) RX ORDER — HEPARIN SODIUM (PORCINE) LOCK FLUSH IV SOLN 100 UNIT/ML 100 UNIT/ML
SOLUTION INTRAVENOUS
Status: DISPENSED
Start: 2024-08-30

## (undated) RX ORDER — MANNITOL 20 G/100ML
INJECTION, SOLUTION INTRAVENOUS
Status: DISPENSED
Start: 2024-04-01

## (undated) RX ORDER — ESMOLOL HYDROCHLORIDE 10 MG/ML
INJECTION INTRAVENOUS
Status: DISPENSED
Start: 2024-04-01

## (undated) RX ORDER — CEFAZOLIN SODIUM 1 G/3ML
INJECTION, POWDER, FOR SOLUTION INTRAMUSCULAR; INTRAVENOUS
Status: DISPENSED
Start: 2024-04-01

## (undated) RX ORDER — HEPARIN SODIUM 5000 [USP'U]/.5ML
INJECTION, SOLUTION INTRAVENOUS; SUBCUTANEOUS
Status: DISPENSED
Start: 2024-04-01